# Patient Record
Sex: MALE | Race: WHITE | NOT HISPANIC OR LATINO | Employment: FULL TIME | URBAN - METROPOLITAN AREA
[De-identification: names, ages, dates, MRNs, and addresses within clinical notes are randomized per-mention and may not be internally consistent; named-entity substitution may affect disease eponyms.]

---

## 2019-07-06 ENCOUNTER — HOSPITAL ENCOUNTER (EMERGENCY)
Facility: HOSPITAL | Age: 54
Discharge: HOME | End: 2019-07-06
Attending: EMERGENCY MEDICINE

## 2019-07-06 VITALS
HEIGHT: 70 IN | WEIGHT: 175 LBS | TEMPERATURE: 98 F | HEART RATE: 98 BPM | OXYGEN SATURATION: 96 % | DIASTOLIC BLOOD PRESSURE: 101 MMHG | SYSTOLIC BLOOD PRESSURE: 135 MMHG | RESPIRATION RATE: 18 BRPM | BODY MASS INDEX: 25.05 KG/M2

## 2019-07-06 DIAGNOSIS — J36 PERITONSILLAR ABSCESS: Primary | ICD-10-CM

## 2019-07-06 LAB
ANION GAP SERPL CALC-SCNC: 12 MMOL/L (ref 8–16)
BASOPHILS # BLD AUTO: 0.04 K/UL (ref 0–0.2)
BASOPHILS NFR BLD: 0.3 % (ref 0–1.9)
BUN SERPL-MCNC: 7 MG/DL (ref 6–20)
CALCIUM SERPL-MCNC: 9.2 MG/DL (ref 8.7–10.5)
CHLORIDE SERPL-SCNC: 104 MMOL/L (ref 95–110)
CO2 SERPL-SCNC: 21 MMOL/L (ref 23–29)
CREAT SERPL-MCNC: 0.9 MG/DL (ref 0.5–1.4)
DIFFERENTIAL METHOD: ABNORMAL
EOSINOPHIL # BLD AUTO: 0.1 K/UL (ref 0–0.5)
EOSINOPHIL NFR BLD: 1 % (ref 0–8)
ERYTHROCYTE [DISTWIDTH] IN BLOOD BY AUTOMATED COUNT: 12.2 % (ref 11.5–14.5)
EST. GFR  (AFRICAN AMERICAN): >60 ML/MIN/1.73 M^2
EST. GFR  (NON AFRICAN AMERICAN): >60 ML/MIN/1.73 M^2
GLUCOSE SERPL-MCNC: 112 MG/DL (ref 70–110)
HCT VFR BLD AUTO: 46.2 % (ref 40–54)
HGB BLD-MCNC: 15.3 G/DL (ref 14–18)
IMM GRANULOCYTES # BLD AUTO: 0.07 K/UL (ref 0–0.04)
IMM GRANULOCYTES NFR BLD AUTO: 0.6 % (ref 0–0.5)
LYMPHOCYTES # BLD AUTO: 1.6 K/UL (ref 1–4.8)
LYMPHOCYTES NFR BLD: 13.4 % (ref 18–48)
MCH RBC QN AUTO: 33.7 PG (ref 27–31)
MCHC RBC AUTO-ENTMCNC: 33.1 G/DL (ref 32–36)
MCV RBC AUTO: 102 FL (ref 82–98)
MONOCYTES # BLD AUTO: 1 K/UL (ref 0.3–1)
MONOCYTES NFR BLD: 8.7 % (ref 4–15)
NEUTROPHILS # BLD AUTO: 9.1 K/UL (ref 1.8–7.7)
NEUTROPHILS NFR BLD: 76 % (ref 38–73)
NRBC BLD-RTO: 0 /100 WBC
PLATELET # BLD AUTO: 241 K/UL (ref 150–350)
PMV BLD AUTO: 9.2 FL (ref 9.2–12.9)
POTASSIUM SERPL-SCNC: 3.8 MMOL/L (ref 3.5–5.1)
RBC # BLD AUTO: 4.54 M/UL (ref 4.6–6.2)
SODIUM SERPL-SCNC: 137 MMOL/L (ref 136–145)
WBC # BLD AUTO: 12 K/UL (ref 3.9–12.7)

## 2019-07-06 PROCEDURE — 96365 THER/PROPH/DIAG IV INF INIT: CPT

## 2019-07-06 PROCEDURE — 99284 PR EMERGENCY DEPT VISIT,LEVEL IV: ICD-10-PCS | Mod: 25,,, | Performed by: EMERGENCY MEDICINE

## 2019-07-06 PROCEDURE — 10021 FNA BX W/O IMG GDN 1ST LES: CPT | Mod: 52

## 2019-07-06 PROCEDURE — 80048 BASIC METABOLIC PNL TOTAL CA: CPT

## 2019-07-06 PROCEDURE — 25000003 PHARM REV CODE 250: Performed by: EMERGENCY MEDICINE

## 2019-07-06 PROCEDURE — 42700 PR INC/DRAIN PERITONSIL ABSCESS: ICD-10-PCS | Mod: ,,, | Performed by: EMERGENCY MEDICINE

## 2019-07-06 PROCEDURE — S0077 INJECTION, CLINDAMYCIN PHOSP: HCPCS | Performed by: EMERGENCY MEDICINE

## 2019-07-06 PROCEDURE — 63600175 PHARM REV CODE 636 W HCPCS: Performed by: EMERGENCY MEDICINE

## 2019-07-06 PROCEDURE — 85025 COMPLETE CBC W/AUTO DIFF WBC: CPT

## 2019-07-06 PROCEDURE — 96375 TX/PRO/DX INJ NEW DRUG ADDON: CPT

## 2019-07-06 PROCEDURE — 87070 CULTURE OTHR SPECIMN AEROBIC: CPT

## 2019-07-06 PROCEDURE — 42700 I&D ABSCESS PERITONSILLAR: CPT | Mod: ,,, | Performed by: EMERGENCY MEDICINE

## 2019-07-06 PROCEDURE — 99284 EMERGENCY DEPT VISIT MOD MDM: CPT | Mod: 25,,, | Performed by: EMERGENCY MEDICINE

## 2019-07-06 PROCEDURE — 99284 EMERGENCY DEPT VISIT MOD MDM: CPT | Mod: 25

## 2019-07-06 RX ORDER — KETOROLAC TROMETHAMINE 30 MG/ML
10 INJECTION, SOLUTION INTRAMUSCULAR; INTRAVENOUS
Status: COMPLETED | OUTPATIENT
Start: 2019-07-06 | End: 2019-07-06

## 2019-07-06 RX ORDER — LIDOCAINE HYDROCHLORIDE 10 MG/ML
5 INJECTION, SOLUTION EPIDURAL; INFILTRATION; INTRACAUDAL; PERINEURAL
Status: COMPLETED | OUTPATIENT
Start: 2019-07-06 | End: 2019-07-06

## 2019-07-06 RX ORDER — KETOROLAC TROMETHAMINE 30 MG/ML
10 INJECTION, SOLUTION INTRAMUSCULAR; INTRAVENOUS
Status: DISCONTINUED | OUTPATIENT
Start: 2019-07-06 | End: 2019-07-06

## 2019-07-06 RX ORDER — DEXAMETHASONE SODIUM PHOSPHATE 4 MG/ML
8 INJECTION, SOLUTION INTRA-ARTICULAR; INTRALESIONAL; INTRAMUSCULAR; INTRAVENOUS; SOFT TISSUE
Status: COMPLETED | OUTPATIENT
Start: 2019-07-06 | End: 2019-07-06

## 2019-07-06 RX ORDER — AMOXICILLIN AND CLAVULANATE POTASSIUM 875; 125 MG/1; MG/1
1 TABLET, FILM COATED ORAL 2 TIMES DAILY
Qty: 14 TABLET | Refills: 0 | Status: SHIPPED | OUTPATIENT
Start: 2019-07-06 | End: 2019-07-13

## 2019-07-06 RX ORDER — CLINDAMYCIN HYDROCHLORIDE 300 MG/1
300 CAPSULE ORAL EVERY 8 HOURS
Qty: 21 CAPSULE | Refills: 0 | Status: SHIPPED | OUTPATIENT
Start: 2019-07-06 | End: 2019-07-13

## 2019-07-06 RX ORDER — HYDROCODONE BITARTRATE AND ACETAMINOPHEN 5; 325 MG/1; MG/1
1 TABLET ORAL EVERY 6 HOURS PRN
Qty: 6 TABLET | Refills: 0 | Status: SHIPPED | OUTPATIENT
Start: 2019-07-06 | End: 2019-07-08

## 2019-07-06 RX ORDER — NAPROXEN 500 MG/1
500 TABLET ORAL 2 TIMES DAILY PRN
Qty: 30 TABLET | Refills: 0 | Status: SHIPPED | OUTPATIENT
Start: 2019-07-06 | End: 2019-12-04

## 2019-07-06 RX ORDER — CLINDAMYCIN PHOSPHATE 600 MG/50ML
600 INJECTION, SOLUTION INTRAVENOUS
Status: COMPLETED | OUTPATIENT
Start: 2019-07-06 | End: 2019-07-06

## 2019-07-06 RX ORDER — AMOXICILLIN AND CLAVULANATE POTASSIUM 875; 125 MG/1; MG/1
1 TABLET, FILM COATED ORAL 2 TIMES DAILY
Qty: 14 TABLET | Refills: 0 | Status: SHIPPED | OUTPATIENT
Start: 2019-07-06 | End: 2019-07-06 | Stop reason: SDUPTHER

## 2019-07-06 RX ORDER — DEXAMETHASONE SODIUM PHOSPHATE 4 MG/ML
8 INJECTION, SOLUTION INTRA-ARTICULAR; INTRALESIONAL; INTRAMUSCULAR; INTRAVENOUS; SOFT TISSUE
Status: DISCONTINUED | OUTPATIENT
Start: 2019-07-06 | End: 2019-07-06

## 2019-07-06 RX ADMIN — CLINDAMYCIN IN 5 PERCENT DEXTROSE 600 MG: 12 INJECTION, SOLUTION INTRAVENOUS at 07:07

## 2019-07-06 RX ADMIN — KETOROLAC TROMETHAMINE 10 MG: 30 INJECTION, SOLUTION INTRAMUSCULAR at 08:07

## 2019-07-06 RX ADMIN — TOPICAL ANESTHETIC: 200 SPRAY DENTAL; PERIODONTAL at 07:07

## 2019-07-06 RX ADMIN — DEXAMETHASONE SODIUM PHOSPHATE 8 MG: 4 INJECTION, SOLUTION INTRAMUSCULAR; INTRAVENOUS at 07:07

## 2019-07-06 RX ADMIN — LIDOCAINE HYDROCHLORIDE 50 MG: 10 INJECTION, SOLUTION EPIDURAL; INFILTRATION; INTRACAUDAL; PERINEURAL at 08:07

## 2019-07-06 RX ADMIN — TOPICAL ANESTHETIC: 200 SPRAY DENTAL; PERIODONTAL at 08:07

## 2019-07-07 NOTE — ED NOTES
Noted.    Will update Dr. Chavez.   Dr. Tobias and SHANEL Santizo, at bedside for I&D. Suction set up at bedside.

## 2019-07-07 NOTE — ED NOTES
Mukesh Cooper, a 54 y.o. male presents to the ED w/ complaint of sore throat. Pt complaining of pain to right side of throat.    Triage note:  Chief Complaint   Patient presents with    Sore Throat     c/o sore throat x 3 days. + dysphagia. Unable to tolerate PO intake. Endorses difficulty speaking due to pain.      Review of patient's allergies indicates:  No Known Allergies  History reviewed. No pertinent past medical history.     Adult Physical Assessment  LOC: Mukesh Cooper, 54 y.o. male verified via two identifiers.  The patient is awake, alert, oriented and speaking appropriately at this time.  APPEARANCE: Patient resting comfortably and appears to be in no acute distress at this time. Patient is clean and well groomed, patient's clothing is properly fastened.  SKIN:The skin is warm and dry, color consistent with ethnicity, patient has normal skin turgor and moist mucus membranes, skin intact, no breakdown or brusing noted.  MUSCULOSKELETAL: Patient moving all extremities well, no obvious swelling or deformities noted.  RESPIRATORY: Airway is open and patent, respirations are spontaneous, patient has a normal effort and rate, no accessory muscle use noted.  HEENT: Pt reports pain with swallowing; pt reports inability to tolerate PO intake. Pt with swelling and redness to right tonsil. Pt with swelling to right throat. Trachea midline.  CARDIAC: Patient has a normal rate and rhythm, no periphreal edema noted in any extremity, capillary refill < 3 seconds in all extremities  ABDOMEN: Soft and non tender to palpation, no abdominal distention noted. Bowel sounds present in all four quadrants.  NEUROLOGIC: Eyes open spontaneously, behavior appropriate to situation, follows commands, facial expression symmetrical, bilateral hand grasp equal and even, purposeful motor response noted, normal sensation in all extremities when touched with a finger.

## 2019-07-07 NOTE — ED NOTES
"Pt states "I don't want to wait for the CT scan. Just get me my discharge papers and I'm going home." Dr. Tobias aware that patient is requesting to leave AMA.  "

## 2019-07-07 NOTE — ED PROVIDER NOTES
Encounter Date: 7/6/2019    SCRIBE #1 NOTE: I, Ansley Tse, am scribing for, and in the presence of,  Dr. Tobias. I have scribed the entire note.       History     Chief Complaint   Patient presents with    Sore Throat     c/o sore throat x 3 days. + dysphagia. Unable to tolerate PO intake. Endorses difficulty speaking due to pain.      Mr. Cooper is a 54 year old male with an unknown past medical history with complaints of a sore throat. The patient endorses a sore throat and difficulty swallowing for the past three days. He states that it feels like his throat is closing.  Pain is rated 10/10, throbbing aching in nature, located in the right side of the throat with associated voice change. He denies experiencing similar symptoms in the past. The patient reports that he is a current smoker. He also endorses diaphoresis, chills, occasional fevers, rhinorrhea, and a voice change for the past two nights. He reports that he tried treating his sore throat with Tylenol with little to no relief. He denies nausea and vomiting.     The history is provided by the patient and medical records.     Review of patient's allergies indicates:  No Known Allergies  No past medical history on file.  No past surgical history on file.  No family history on file.  Social History     Tobacco Use    Smoking status: Not on file   Substance Use Topics    Alcohol use: Not on file    Drug use: Not on file     Review of Systems   Constitutional: Positive for chills and diaphoresis. Negative for fever.   HENT: Positive for rhinorrhea, sore throat, trouble swallowing and voice change.    Eyes: Negative for photophobia.   Respiratory: Negative for cough and shortness of breath.    Cardiovascular: Negative for chest pain.   Gastrointestinal: Negative for nausea and vomiting.   Genitourinary: Negative for difficulty urinating and dysuria.   Musculoskeletal: Negative for back pain and neck pain.   Skin: Negative for rash.   Neurological: Negative  for weakness and numbness.   Hematological: Does not bruise/bleed easily.       Physical Exam     Initial Vitals [07/06/19 1910]   BP Pulse Resp Temp SpO2   (!) 135/101 98 18 98.2 °F (36.8 °C) 96 %      MAP       --         Physical Exam    Constitutional: He appears well-developed and well-nourished. He is not diaphoretic. No distress.   HENT:   Head: Normocephalic and atraumatic.   Mouth/Throat: No trismus in the jaw. No uvula swelling. Posterior oropharyngeal erythema and tonsillar abscesses present.   Large right peritonsillar abscess with erythema. Uvula shifted to the left.   Neck: Normal range of motion. Neck supple. No JVD present.   Cervical lymphadenopathy.   Cardiovascular: Normal rate, regular rhythm, normal heart sounds and intact distal pulses.   Pulmonary/Chest: No respiratory distress. He has wheezes (Diffuse experitory exterior and interior.). He has no rhonchi. He has no rales.   Abdominal: Soft. He exhibits no distension. There is no tenderness.   Musculoskeletal: Normal range of motion. He exhibits no edema.   Lymphadenopathy:     He has no cervical adenopathy.   Neurological: He is alert and oriented to person, place, and time. He has normal strength. No cranial nerve deficit or sensory deficit.   Skin: Skin is warm and dry.         ED Course   Abscess Aspiration  Date/Time: 7/6/2019 9:39 PM  Performed by: Omayra Tobias MD  Authorized by: Omayra Tobias MD     Consent Done?:  Emergent Situation  A time out verifies correct patient, procedure, equipment, support staff and site/side marked as required:   Procedure Details:     Location of Abscess #1:  Right PTA    Size of needle #1:  18    Aspirated amount #1 (mL):  1  Material send for:  Aerobic Culture  Post-procedure:     Patient tolerance:  Patient tolerated the procedure well with no immediate complications      Labs Reviewed   CBC W/ AUTO DIFFERENTIAL - Abnormal; Notable for the following components:       Result Value    RBC 4.54 (*)      Mean Corpuscular Volume 102 (*)     Mean Corpuscular Hemoglobin 33.7 (*)     Immature Granulocytes 0.6 (*)     Gran # (ANC) 9.1 (*)     Immature Grans (Abs) 0.07 (*)     Gran% 76.0 (*)     Lymph% 13.4 (*)     All other components within normal limits   BASIC METABOLIC PANEL - Abnormal; Notable for the following components:    CO2 21 (*)     Glucose 112 (*)     All other components within normal limits          Imaging Results    None          Medical Decision Making:   History:   Old Medical Records: I decided to obtain old medical records.  Initial Assessment:   Emergent evaluation of a 54 year old male with sore throat and dysphagia.   Differential Diagnosis:   My initial diagnoses include acute pharyngitis, tonsillitis, peritonsillar abscess, retropharyngeal abscess.   Clinical Tests:   Lab Tests: Ordered and Reviewed  Radiological Study: Ordered  ED Management:  - labs   - steroids   - antibiotics   - needle aspiration    Labs reviewed with leukocytosis of 12,000, CMP within normal limits.  Patient treated with steroids and 600 to clinda IV.  Needle aspiration attempted with minimal amount of blood, no visible pus.  I am unable to locate the pocket, will order CT neck soft tissue further evaluate.    9:00 PM  Discussed plan with patient, he is refusing imaging and wants to sign out AMA stating pain has improved. We discussed concerns incomplete drainage, worsening infection leading to sepsis or difficulty breathing.  He can repeat risk and understands.  He has agreed to one more attempt at drainage.    Needle aspiration performed with 1 cc purulent drainage.  Pt still requesting discharge.  Will provide px with Augmentin and clinda as he has limited income and is concerned with cost.                    Scribe Attestation:   Scribe #1: I performed the above scribed service and the documentation accurately describes the services I performed. I attest to the accuracy of the note.    Attending Attestation:            Physician Attestation for Scribe:      Comments: I, Dr. Omayra Tobias, personally performed the services described in this documentation. All medical record entries made by the scribe were at my direction and in my presence.  I have reviewed the chart and agree that the record reflects my personal performance and is accurate and complete. Omayra Tobias MD.                 Clinical Impression:       ICD-10-CM ICD-9-CM   1. Peritonsillar abscess J36 475         Disposition:   Disposition: ABIEL Tobias MD  07/06/19 2141

## 2019-07-08 LAB — BACTERIA SPEC AEROBE CULT: NORMAL

## 2019-07-09 LAB — BACTERIA SPEC AEROBE CULT: NORMAL

## 2019-12-04 ENCOUNTER — HOSPITAL ENCOUNTER (EMERGENCY)
Facility: HOSPITAL | Age: 54
Discharge: HOME | End: 2019-12-04
Attending: EMERGENCY MEDICINE
Payer: MEDICAID

## 2019-12-04 VITALS
BODY MASS INDEX: 25.18 KG/M2 | RESPIRATION RATE: 18 BRPM | SYSTOLIC BLOOD PRESSURE: 152 MMHG | DIASTOLIC BLOOD PRESSURE: 97 MMHG | OXYGEN SATURATION: 100 % | HEART RATE: 90 BPM | TEMPERATURE: 98 F | HEIGHT: 69 IN | WEIGHT: 170 LBS

## 2019-12-04 DIAGNOSIS — S89.92XA LEFT KNEE INJURY: Primary | ICD-10-CM

## 2019-12-04 PROCEDURE — 99284 EMERGENCY DEPT VISIT MOD MDM: CPT | Mod: 25

## 2019-12-04 PROCEDURE — 99284 EMERGENCY DEPT VISIT MOD MDM: CPT | Mod: ,,, | Performed by: EMERGENCY MEDICINE

## 2019-12-04 PROCEDURE — 99284 PR EMERGENCY DEPT VISIT,LEVEL IV: ICD-10-PCS | Mod: ,,, | Performed by: EMERGENCY MEDICINE

## 2019-12-04 PROCEDURE — 25000003 PHARM REV CODE 250: Performed by: EMERGENCY MEDICINE

## 2019-12-04 RX ORDER — HYDROCODONE BITARTRATE AND ACETAMINOPHEN 5; 325 MG/1; MG/1
1 TABLET ORAL EVERY 6 HOURS PRN
Qty: 12 TABLET | Refills: 0 | Status: SHIPPED | OUTPATIENT
Start: 2019-12-04 | End: 2019-12-07

## 2019-12-04 RX ORDER — OXYCODONE HYDROCHLORIDE 5 MG/1
10 TABLET ORAL
Status: COMPLETED | OUTPATIENT
Start: 2019-12-04 | End: 2019-12-04

## 2019-12-04 RX ORDER — IBUPROFEN 600 MG/1
600 TABLET ORAL EVERY 6 HOURS PRN
Qty: 20 TABLET | Refills: 0 | Status: SHIPPED | OUTPATIENT
Start: 2019-12-04 | End: 2020-12-03 | Stop reason: CLARIF

## 2019-12-04 RX ORDER — HYDROCODONE BITARTRATE AND ACETAMINOPHEN 10; 325 MG/1; MG/1
1 TABLET ORAL
Status: COMPLETED | OUTPATIENT
Start: 2019-12-04 | End: 2019-12-04

## 2019-12-04 RX ADMIN — HYDROCODONE BITARTRATE AND ACETAMINOPHEN 1 TABLET: 10; 325 TABLET ORAL at 06:12

## 2019-12-04 RX ADMIN — OXYCODONE HYDROCHLORIDE 10 MG: 5 TABLET ORAL at 08:12

## 2019-12-05 NOTE — ED PROVIDER NOTES
Encounter Date: 12/4/2019    SCRIBE #1 NOTE: I, Namrata Nettles, am scribing for, and in the presence of,  Dr. Deluca. I have scribed the following portions of the note - Other sections scribed: MAKI PRINCE.       History     Chief Complaint   Patient presents with    Knee Pain     my knee is busted, hit with hammer today while working.      53 yo M with no significant pmhx presents with a chief complaint of left knee injury. Injury occurred at 2:00pm this afternoon.  Patient was using a work hammer when he accidentally hit his left knee.  He has had pain and swelling since that time.  He has been unable to bear weight secondary to pain. No weakness or paresthesias.  Pain is worsened by movement.  He has not taken any medication for the pain. He has not applied any ice to it.  No previous injury to the knee.  No anticoagulants.    The history is provided by the patient and medical records.     Review of patient's allergies indicates:  No Known Allergies  History reviewed. No pertinent past medical history.  History reviewed. No pertinent surgical history.  History reviewed. No pertinent family history.  Social History     Tobacco Use    Smoking status: Not on file   Substance Use Topics    Alcohol use: Not on file    Drug use: Not on file     Review of Systems   Constitutional: Negative for fever.   HENT: Negative for sore throat.    Respiratory: Negative for shortness of breath.    Cardiovascular: Negative for chest pain.   Gastrointestinal: Negative for nausea.   Genitourinary: Negative for dysuria.   Musculoskeletal: Positive for joint swelling. Negative for back pain and myalgias.   Skin: Positive for wound. Negative for color change and rash.   Neurological: Negative for weakness and numbness.   Hematological: Does not bruise/bleed easily.       Physical Exam     Initial Vitals [12/04/19 1702]   BP Pulse Resp Temp SpO2   (!) 166/73 95 18 97.6 °F (36.4 °C) 95 %      MAP       --         Physical  Exam    Nursing note and vitals reviewed.  Constitutional: He appears well-developed and well-nourished. He is not diaphoretic. No distress.   HENT:   Head: Normocephalic.   Neck: Neck supple.   Cardiovascular: Normal rate.   Pulses:       Dorsalis pedis pulses are 2+ on the right side, and 2+ on the left side.   Pulmonary/Chest: No respiratory distress.   Musculoskeletal: He exhibits tenderness.        Left knee: He exhibits decreased range of motion, swelling (most pronounced in the medial aspect of superior knee), effusion and deformity. He exhibits no ecchymosis and no laceration. Tenderness (tenderness most pronounced in medial aspect of superior knee) found. No medial joint line, no lateral joint line and no patellar tendon tenderness noted.        Legs:  Exquisite pain with flexion of left knee, but not rigid.  Able to actively flex and extend with pain   Neurological: He is alert.   Skin: Skin is warm.         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  Correlate for point tenderness at this region.      Electronically signed by: Chuck Morel MD  Date:    12/04/2019  Time:    19:21             Narrative:    EXAMINATION:  XR KNEE 3 VIEW LEFT    CLINICAL HISTORY:  Unspecified injury of left lower leg, initial encounter    TECHNIQUE:  AP, lateral, and Merchant views of the left knee were performed.    COMPARISON:  None    FINDINGS:  Suspected nonspecific suprapatellar joint effusion.  There is well corticated osseous fragment adjacent to the superior and lateral aspect of the patella with corresponding adjacent defect in the patella suggesting sequela of  remote trauma versus bipartite patella configuration.  Overall alignment is otherwise within normal limits.  No convincing evidence of acute displaced fracture, dislocation or destructive osseous process.  Minimal tricompartmental degenerative change.                                 Medical Decision Making:   History:   Old Medical Records: I decided to obtain old medical records.  Initial Assessment:   55 yo M with no significant pmhx presents with a chief complaint of left knee injury.  Differential Diagnosis:   Fracture, sprain, strain, contusion, hematoma  Clinical Tests:   Radiological Study: Ordered  ED Management:  No skin disruption to require tetanus.  Will administer ice and analgesics.  Will obtain x-ray.    Reassessment:  On my read, x-ray concerning for patellar fracture but radiology disagreed.  Orthopedics consulted for management recommendations.  On re-evaluation, patient reports pain has improved but is still present.  Will administer additional analgesics.    Reassessment:  Orthopedics feels this is inconsistent with an acute fracture in rather a bipartitie patella.  They recommend symptomatic treatment and no follow-up.  Patient was provided with a knee immobilizer and crutches as he requested.  He was provided with instructions for RICE and symptomatic treatment.  Scripts for analgesics and NSAIDs provided.  Provided with sports medicine clinic for follow-up p.r.n..            Scribe Attestation:   Scribe #1: I performed the above scribed service and the documentation accurately describes the services I performed. I attest to the accuracy of the note.    Attending Attestation:           Physician Attestation for Scribe:      Comments: I, Dr. Anival Deluca, personally performed the services described in this documentation. All medical record entries made by the scribe were at my direction and in my presence.  I have reviewed the chart and agree that the record reflects my personal performance and  is accurate and complete. Anival Deluca MD.  8:54 PM 12/04/2019                            Clinical Impression:       ICD-10-CM ICD-9-CM   1. Left knee injury S89.92XA 959.7                             Anival Deluca MD  12/04/19 2051

## 2019-12-05 NOTE — CONSULTS
Orthopedic Surgery  Consult Note    Kenneth Mayorga  12/05/2019    CC:  Left knee pain and swelling    HPI: Kenneth Mayorga is a 54 y.o. male with no significant PMHx who presents with left knee pain and swelling. Patients states using a hammer when he accidentally leg go and it bounced from the floor and hit him in the left knee. Denies prior injuries or surgeries to the left knee. On nothing for blood thinners.  He ambulates at baseline without any assistive devices.  Denies other MSK pains or paresthesias.       History reviewed. No pertinent past medical history.  History reviewed. No pertinent surgical history.  History reviewed. No pertinent family history.  Social History     Socioeconomic History    Marital status: Single     Spouse name: Not on file    Number of children: Not on file    Years of education: Not on file    Highest education level: Not on file   Occupational History    Not on file   Social Needs    Financial resource strain: Not on file    Food insecurity:     Worry: Not on file     Inability: Not on file    Transportation needs:     Medical: Not on file     Non-medical: Not on file   Tobacco Use    Smoking status: Not on file   Substance and Sexual Activity    Alcohol use: Not on file    Drug use: Not on file    Sexual activity: Not on file   Lifestyle    Physical activity:     Days per week: Not on file     Minutes per session: Not on file    Stress: Not on file   Relationships    Social connections:     Talks on phone: Not on file     Gets together: Not on file     Attends Hinduism service: Not on file     Active member of club or organization: Not on file     Attends meetings of clubs or organizations: Not on file     Relationship status: Not on file   Other Topics Concern    Not on file   Social History Narrative    Not on file     No current facility-administered medications on file prior to encounter.      No current outpatient medications on file prior to encounter.          Review of Systems:  Constitutional: negative for fevers  Eyes: negative visual changes  ENT: negative for hearing loss  Respiratory: negative for dyspnea  Cardiovascular: negative for chest pain  Gastrointestinal: negative for abdominal pain  Genitourinary: negative for dysuria  Neurological: negative for headaches  Behavioral/Psych: negative for hallucinations  Endocrine: negative for temperature intolerance      Physical Exam:    Temp:  [97.6 °F (36.4 °C)] 97.6 °F (36.4 °C)  Pulse:  [90-95] 90  Resp:  [18] 18  SpO2:  [95 %-100 %] 100 %  BP: (152-166)/(73-97) 152/97    Vitals: Afebrile.  Vital signs stable.  General: No acute distress.  HEENT: Normocephalic. Atraumatic. Sclera anicteric. No tracheal deviation.  Cardio: Regular rate.  Chest: No increased work of breathing.  Abdominal: Nondistended.  Extremities: No cyanosis.  No clubbing.    Skin: No generalized rash.  Neuro: Awake. Alert. Oriented to person, place, time, and situation.  Psych: Normal appearance. Cooperative.  Appropriate mood.  Appropriate affect.      MSK:  LLE:  Skin intact throughout, no scars present, no abrasions  Moderate swelling around the  Superior/medial knee   No ecchymosis, erythema, or signs of cellulitis  TTP around the swelling  No joint line tenderness  No tenderness to the patella  No tenderness to palpation of proximal, middle, or distal aspects of femur, tibia, or fibula  No tenderness to palpation of foot  Full painless ROM of hip, knee, and ankle  Extensor mechanism is intact  No laxity with varus or valgus stress at knee  Compartments soft  SILT Sa/C[place]/DP/SP/T  Motor intact EHL/FHL/TA/Gastroc  2+ DP  Brisk capillary refill       Diagnostic Results:  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  There are no signs of any acute fractures or dislocation.  There is a moderate soft tissue edema of the medial aspect of the knee that does not extend into the joint    Assessment/Plan:  Kenneth Mayorga is a 54 y.o. male with  swelling of the medial aspect of the knee after sustaining a blow from a hammer.  There is no fractures. 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    - WBAT to LLE  - recommend compressive dressing around area of swelling  - Pt encouraged to keep extremity iced and elevated at all times  - Pain control per ED  - patient does not need orthopedic f/u for bipartite patella      Raffaele Cole MD  Orthopedic Surgery Resident  12/05/2019

## 2019-12-05 NOTE — ED NOTES
Patient identifiers have been checked and are correct.      LOC: The patient is awake, alert, and aware of environment. The patient is oriented x 3 and speaking appropriately.   APPEARANCE: No acute distress noted.   PSYCHOSOCIAL: Patient is calm and cooperative.  SKIN: The skin is warm, dry.  RESPIRATORY: Airway is open and patent. Bilateral chest rise and fall. Respirations are spontaneous, even and unlabored. Normal effort and rate noted. No accessory muscle use noted.   CARDIAC: Patient has a normal rate.   NEUROLOGIC: Eyes open spontaneously. Speech clear. Tolerating saliva secretions well. Able to follow commands, demonstrating ability to actively and appropriately communicate within context of current conversation. Symmetrical facial muscles. Moving all extremities. Movement is purposeful.   MUSCULOSKELETAL: Swelling and pain to left knee noted.

## 2020-12-03 ENCOUNTER — HOSPITAL ENCOUNTER (EMERGENCY)
Facility: HOSPITAL | Age: 55
Discharge: HOME | End: 2020-12-03
Attending: EMERGENCY MEDICINE
Payer: MEDICAID

## 2020-12-03 VITALS
TEMPERATURE: 98 F | HEIGHT: 69 IN | HEART RATE: 79 BPM | BODY MASS INDEX: 25.92 KG/M2 | OXYGEN SATURATION: 98 % | WEIGHT: 175 LBS | RESPIRATION RATE: 16 BRPM | SYSTOLIC BLOOD PRESSURE: 156 MMHG | DIASTOLIC BLOOD PRESSURE: 98 MMHG
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
## 2020-12-03 DIAGNOSIS — M25.512 ACUTE PAIN OF LEFT SHOULDER: Primary | ICD-10-CM

## 2020-12-03 DIAGNOSIS — M47.816 SPONDYLOSIS OF LUMBAR REGION WITHOUT MYELOPATHY OR RADICULOPATHY: ICD-10-CM

## 2020-12-03 DIAGNOSIS — R91.1 NODULE OF LEFT LUNG: ICD-10-CM

## 2020-12-03 LAB
ALBUMIN SERPL BCP-MCNC: 3.5 G/DL (ref 3.5–5.2)
ALP SERPL-CCNC: 107 U/L (ref 55–135)
ALT SERPL W/O P-5'-P-CCNC: 105 U/L (ref 10–44)
AMPHET+METHAMPHET UR QL: NEGATIVE
ANION GAP SERPL CALC-SCNC: 14 MMOL/L (ref 8–16)
AST SERPL-CCNC: 167 U/L (ref 10–40)
BARBITURATES UR QL SCN>200 NG/ML: NEGATIVE
BASOPHILS # BLD AUTO: 0.06 K/UL (ref 0–0.2)
BASOPHILS NFR BLD: 1.2 % (ref 0–1.9)
BENZODIAZ UR QL SCN>200 NG/ML: ABNORMAL
BILIRUB SERPL-MCNC: 0.5 MG/DL (ref 0.1–1)
BILIRUB UR QL STRIP: NEGATIVE
BUN SERPL-MCNC: 11 MG/DL (ref 6–20)
BUN SERPL-MCNC: 11 MG/DL (ref 6–30)
BZE UR QL SCN: NEGATIVE
CALCIUM SERPL-MCNC: 8.7 MG/DL (ref 8.7–10.5)
CANNABINOIDS UR QL SCN: ABNORMAL
CHLORIDE SERPL-SCNC: 101 MMOL/L (ref 95–110)
CHLORIDE SERPL-SCNC: 102 MMOL/L (ref 95–110)
CLARITY UR REFRACT.AUTO: CLEAR
CO2 SERPL-SCNC: 22 MMOL/L (ref 23–29)
COLOR UR AUTO: YELLOW
CREAT SERPL-MCNC: 0.9 MG/DL (ref 0.5–1.4)
CREAT SERPL-MCNC: 1 MG/DL (ref 0.5–1.4)
CREAT UR-MCNC: 169 MG/DL (ref 23–375)
DIFFERENTIAL METHOD: ABNORMAL
EOSINOPHIL # BLD AUTO: 0.1 K/UL (ref 0–0.5)
EOSINOPHIL NFR BLD: 2.6 % (ref 0–8)
ERYTHROCYTE [DISTWIDTH] IN BLOOD BY AUTOMATED COUNT: 12.7 % (ref 11.5–14.5)
EST. GFR  (AFRICAN AMERICAN): >60 ML/MIN/1.73 M^2
EST. GFR  (NON AFRICAN AMERICAN): >60 ML/MIN/1.73 M^2
ETHANOL UR-MCNC: 142 MG/DL
GLUCOSE SERPL-MCNC: 93 MG/DL (ref 70–110)
GLUCOSE SERPL-MCNC: 93 MG/DL (ref 70–110)
GLUCOSE UR QL STRIP: NEGATIVE
HCT VFR BLD AUTO: 44.5 % (ref 40–54)
HCT VFR BLD CALC: 47 %PCV (ref 36–54)
HGB BLD-MCNC: 14.5 G/DL (ref 14–18)
HGB UR QL STRIP: NEGATIVE
IMM GRANULOCYTES # BLD AUTO: 0.01 K/UL (ref 0–0.04)
IMM GRANULOCYTES NFR BLD AUTO: 0.2 % (ref 0–0.5)
KETONES UR QL STRIP: NEGATIVE
LEUKOCYTE ESTERASE UR QL STRIP: NEGATIVE
LYMPHOCYTES # BLD AUTO: 1.9 K/UL (ref 1–4.8)
LYMPHOCYTES NFR BLD: 38.2 % (ref 18–48)
MCH RBC QN AUTO: 34.2 PG (ref 27–31)
MCHC RBC AUTO-ENTMCNC: 32.6 G/DL (ref 32–36)
MCV RBC AUTO: 105 FL (ref 82–98)
METHADONE UR QL SCN>300 NG/ML: NEGATIVE
MONOCYTES # BLD AUTO: 0.6 K/UL (ref 0.3–1)
MONOCYTES NFR BLD: 12.4 % (ref 4–15)
NEUTROPHILS # BLD AUTO: 2.3 K/UL (ref 1.8–7.7)
NEUTROPHILS NFR BLD: 45.4 % (ref 38–73)
NITRITE UR QL STRIP: NEGATIVE
NRBC BLD-RTO: 0 /100 WBC
OPIATES UR QL SCN: NEGATIVE
PCP UR QL SCN>25 NG/ML: NEGATIVE
PH UR STRIP: 5 [PH] (ref 5–8)
PLATELET # BLD AUTO: 196 K/UL (ref 150–350)
PMV BLD AUTO: 9.9 FL (ref 9.2–12.9)
POC IONIZED CALCIUM: 1.05 MMOL/L (ref 1.06–1.42)
POC TCO2 (MEASURED): 24 MMOL/L (ref 23–29)
POTASSIUM BLD-SCNC: 3.6 MMOL/L (ref 3.5–5.1)
POTASSIUM SERPL-SCNC: 3.6 MMOL/L (ref 3.5–5.1)
PROT SERPL-MCNC: 7.5 G/DL (ref 6–8.4)
PROT UR QL STRIP: NEGATIVE
RBC # BLD AUTO: 4.24 M/UL (ref 4.6–6.2)
SAMPLE: ABNORMAL
SODIUM BLD-SCNC: 137 MMOL/L (ref 136–145)
SODIUM SERPL-SCNC: 137 MMOL/L (ref 136–145)
SP GR UR STRIP: 1.02 (ref 1–1.03)
TOXICOLOGY INFORMATION: ABNORMAL
TROPONIN I SERPL DL<=0.01 NG/ML-MCNC: 0.01 NG/ML (ref 0–0.03)
URN SPEC COLLECT METH UR: NORMAL
WBC # BLD AUTO: 5 K/UL (ref 3.9–12.7)

## 2020-12-03 PROCEDURE — 81003 URINALYSIS AUTO W/O SCOPE: CPT

## 2020-12-03 PROCEDURE — 80053 COMPREHEN METABOLIC PANEL: CPT

## 2020-12-03 PROCEDURE — 93010 EKG 12-LEAD: ICD-10-PCS | Mod: ,,, | Performed by: INTERNAL MEDICINE

## 2020-12-03 PROCEDURE — 99284 PR EMERGENCY DEPT VISIT,LEVEL IV: ICD-10-PCS | Mod: ,,, | Performed by: EMERGENCY MEDICINE

## 2020-12-03 PROCEDURE — 96374 THER/PROPH/DIAG INJ IV PUSH: CPT | Mod: 59

## 2020-12-03 PROCEDURE — 99285 EMERGENCY DEPT VISIT HI MDM: CPT | Mod: 25

## 2020-12-03 PROCEDURE — 84484 ASSAY OF TROPONIN QUANT: CPT

## 2020-12-03 PROCEDURE — 85025 COMPLETE CBC W/AUTO DIFF WBC: CPT

## 2020-12-03 PROCEDURE — 93005 ELECTROCARDIOGRAM TRACING: CPT

## 2020-12-03 PROCEDURE — 80307 DRUG TEST PRSMV CHEM ANLYZR: CPT

## 2020-12-03 PROCEDURE — 25500020 PHARM REV CODE 255: Performed by: EMERGENCY MEDICINE

## 2020-12-03 PROCEDURE — 63600175 PHARM REV CODE 636 W HCPCS: Performed by: STUDENT IN AN ORGANIZED HEALTH CARE EDUCATION/TRAINING PROGRAM

## 2020-12-03 PROCEDURE — 93010 ELECTROCARDIOGRAM REPORT: CPT | Mod: ,,, | Performed by: INTERNAL MEDICINE

## 2020-12-03 PROCEDURE — 99284 EMERGENCY DEPT VISIT MOD MDM: CPT | Mod: ,,, | Performed by: EMERGENCY MEDICINE

## 2020-12-03 RX ORDER — NAPROXEN 500 MG/1
500 TABLET ORAL 2 TIMES DAILY WITH MEALS
Qty: 28 TABLET | Refills: 0 | Status: SHIPPED | OUTPATIENT
Start: 2020-12-03 | End: 2020-12-17

## 2020-12-03 RX ORDER — MORPHINE SULFATE 2 MG/ML
3 INJECTION, SOLUTION INTRAMUSCULAR; INTRAVENOUS
Status: COMPLETED | OUTPATIENT
Start: 2020-12-03 | End: 2020-12-03

## 2020-12-03 RX ADMIN — MORPHINE SULFATE 3 MG: 2 INJECTION, SOLUTION INTRAMUSCULAR; INTRAVENOUS at 12:12

## 2020-12-03 RX ADMIN — IOHEXOL 75 ML: 350 INJECTION, SOLUTION INTRAVENOUS at 12:12

## 2020-12-03 NOTE — DISCHARGE INSTRUCTIONS
Contains abnormal data CT Chest Abdomen Pelvis With Contrast (Final result)  Result time 12/03/20 12:57:33  Final result by Chuck Morel MD (12/03/20 12:57:33)                Impression:      1. No acute process seen within the chest, abdomen or pelvis or evidence of pneumothorax, solid organ injury or acute displaced fracture-dislocation.   2. Hepatomegaly with suspected asymmetric geographic fatty infiltration predominantly in the right hepatic lobe.  Clinical correlation and with LFTs advised.   3. Mild diverticulosis coli without diverticulitis.   4. Coronary and systemic atherosclerosis.   5. Left lower lobe 3 mm ground-glass nodule.  For a ground glass nodule <6 mm, Fleischner Society 2017 guidelines recommend no routine follow up. However, suspicious features could warrant follow up with non-contrast chest CT at 2 years and 4 years after discovery.   6. L5 bilateral remote pars defects with associated minimal grade 1 anterolisthesis of L5 on S1.  Suspected asymmetric advanced degenerative disc disease at L2-3 level, with sequela of remote/chronic discitis osteomyelitis not excluded in the proper clinical setting, given no priors available for comparison/chronicity determination.  No convincing CT evidence of active/destructive discitis osteomyelitis at this time.  Correlate clinically.   7. Left renal 2.3 cm cyst.   8. Few additional findings as above.   This report was flagged in Epic as abnormal.  This report was flagged in Epic as containing an incidental finding.       Electronically signed by: Chuck Morel MD   Date: 12/03/2020   Time: 12:57            Narrative:    EXAMINATION:   CT CHEST ABDOMEN PELVIS WITH CONTRAST (XPD)     CLINICAL HISTORY:   fall;     TECHNIQUE:   Axial images of the chest, abdomen, and pelvis were acquired  after the use of 75 cc [id number] IV contrast.  Coronal and sagittal reconstructions were also generated.     COMPARISON:   None     FINDINGS:   Thoracic soft tissues: No  significant abnormality.     Aorta: Normal in course and caliber, without significant atherosclerotic plaque. There are three branching vessels at the arch.     Heart: Normal in size. No pericardial effusion. Multi-vessel coronary arterial calcifications noted.     Karin/Mediastinum: No significant lymphadenopathy     Lungs: Well aerated, without consolidation, pneumothorax or pleural fluid. Calcified granuloma within the anteromedial aspect of the left upper lobe.  2-3 mm solid pleural based nodule along the left major fissure.  There is a 3 mm ground-glass nodule within the posterolateral left lower lobe.  Few scattered linear opacities consistent with subsegmental scarring versus atelectasis.     Liver: Enlarged measuring 20 cm in maximum coronal length.  There is moderate to large-sized area of geographic hypoattenuation throughout majority of the right hepatic lobe and small area of geographic hypoattenuation at the inferior aspect of the medial left hepatic lobe near the fissure for the falciform ligament favored to represent asymmetric partial fatty infiltration.  No discrete hepatic mass seen.     Gallbladder: No calcified gallstones.     Bile Ducts: No evidence of dilated ducts.     Pancreas: No mass or peripancreatic fat stranding.     Spleen: Unremarkable.     Adrenals: Unremarkable.     Kidneys/ Ureters: Normal in size and location with relatively symmetric normal enhancement.  There is a 2.3 cm simple appearing parenchymal cyst at the left renal lower pole.  A few scattered tiny hypoattenuating cortical foci at the left kidney which are too small to characterize.  No hydronephrosis or nephrolithiasis. No ureteral dilatation.     Bladder: Suboptimally distended with mild circumferential wall thickening but no adjacent inflammation.     Reproductive organs: Prostate is normal in size containing dystrophic calcifications in the central gland.  Scattered calcifications of the bilateral seminal vesicles, left  greater than right.     GI Tract/Mesentery: No evidence of bowel obstruction or acute inflammation.  Multiple scattered colonic diverticula without evidence of acute diverticulitis.  There is long segment mural fat stratification of the ascending colon and majority of the transverse colon, nonspecific but likely sequela of remote/chronic infectious or inflammatory process or chronic laxative use.  No pneumatosis or portal venous gas.  Appendix and terminal ileum are within normal limits.     Peritoneal Space: No ascites. No free air.     Retroperitoneum:  No significant adenopathy.     Abdominal wall:  No significant abnormality.     Vasculature: Mild to moderate scattered calcific atherosclerosis of the aorta without aneurysm or dissection.  Minimal to mild scattered calcific atherosclerosis elsewhere.     Bones: Multilevel chronic appearing mild anterior wedge deformity of several mid to lower thoracic and also upper lumbar vertebral bodies.  Multilevel well corticated Susan node deformities of several superior and inferior endplates of mid to lower thoracic and upper lumbar spine.  Marked loss of disc space at L2-3 level with moderate to marked degree of sclerosis of the lower half of L2 and upper half of L3 with small marginal osteophytes and mild facet arthrosis most consistent with sequela of asymmetric advanced degenerative disc disease.  No adjacent soft tissue swelling, mass or fluid collection and no evidence of acute pathologic fracture.  There is 2 mm grade 1 anterolisthesis of L5 on S1 likely related to chronic appearing bilateral L5 pars defects.  No acute fracture-dislocation.  Age-appropriate degenerative changes elsewhere.                     CT Head Without Contrast (Final result)  Result time 12/03/20 12:27:19  Final result by Chuck Morel MD (12/03/20 12:27:19)                Impression:      No acute intracranial abnormality.     Left maxillary nonspecific chronic sinusitis with more  centralized inspissated secretions, noting superimposed fungal etiology not excluded.       Electronically signed by: Chuck Morel MD   Date: 12/03/2020   Time: 12:27            Narrative:    EXAMINATION:   CT HEAD WITHOUT CONTRAST     CLINICAL HISTORY:   fall;     TECHNIQUE:   Low dose axial CT images obtained throughout the head without intravenous contrast. Sagittal and coronal reconstructions were performed.     COMPARISON:   None.     FINDINGS:   Intracranial compartment: Brain appears normally formed.     Ventricles and sulci are normal in size for age without evidence of hydrocephalus. No extra-axial blood or fluid collections.     The brain parenchyma appears normal. No parenchymal mass, hemorrhage, edema or major vascular distribution infarct.     Skull/extracranial contents (limited evaluation): No fracture.  There is complete opacification of the left maxillary sinus extending medially into the ostiomeatal complex, with mucoperiosteal thickening as well as more centralized slightly heterogeneous hyperattenuation suggesting sequela chronic sinusitis with inspissated secretions; however, fungal etiology not excluded.  Additional patchy opacification of several anterior left ethmoidal air cells.  No air-fluid level seen.  Mastoid air cells and remaining imaged paranasal sinuses are essentially clear.  Imaged portions of the orbits are within normal limits.                     X-Ray Scapula Left (Final result)  Result time 12/03/20 12:04:31  Final result by Chacho Green III, MD (12/03/20 12:04:31)                Narrative:    EXAMINATION:   XR SCAPULA LEFT     CLINICAL HISTORY:   Unspecified fall, initial encounter     FINDINGS:   Scapula left: No fracture dislocation bone destruction seen.       Electronically signed by: Chacho Green MD   Date: 12/03/2020   Time: 12:04                    X-Ray Shoulder Trauma Left (Final result)  Result time 12/03/20 12:03:54  Final result by Chacho Green III, MD  (12/03/20 12:03:54)                Narrative:    EXAMINATION:   XR SHOULDER TRAUMA 3 VIEW LEFT     CLINICAL HISTORY:   Unspecified fall, initial encounter     FINDINGS:   Three views left shoulder: No fracture dislocation bone destruction seen.       Electronically signed by: Chacho Green MD   Date: 12/03/2020   Time: 12:03

## 2020-12-03 NOTE — ED NOTES
I-STAT Chem-8+ Results:   Value Reference Range   Sodium 137 136-145 mmol/L   Potassium  3.6 3.5-5.1 mmol/L   Chloride 102  mmol/L   Ionized Calcium 1.05 1.06-1.42 mmol/L   CO2 (measured) 24 23-29 mmol/L   Glucose 93  mg/dL   BUN 11 6-30 mg/dL   Creatinine 1.0 0.5-1.4 mg/dL   Hematocrit 47 36-54%

## 2020-12-03 NOTE — ED PROVIDER NOTES
"Encounter Date: 12/3/2020       History     Chief Complaint   Patient presents with    Shoulder Pain     Pt fell off roof a few days ago.  C/O left shoulder pain.  Also rib pain.  Pt also concerned he has cancer b/c his "stool just aint normal".  Onset 1yr ago     Mr. Mayorga is a 55-year-old male with no significant past medical history who presents emergency department due to left shoulder pain.  He states that on Tuesday morning he was cleaning the gutters of a roof when he tripped and fell forward striking his abdomen on the ladder and then fell down to the ground landing on his left shoulder.  He initially thought  the pain was going to resolve at home but decided to come to the emergency department because he states that he is having trouble lifting his left arm above his head.   He states that he has a sensation of electric shocks going down from his shoulder to his left fingers. He also has pain in his right middle quadrant of his abdomen. He last ate a donut at 7:30 a.m. this morning. He states that he drinks daily and admitted to drinking alcohol today he also smokes marijuana daily.         Review of patient's allergies indicates:  No Known Allergies  No past medical history on file.  No past surgical history on file.  No family history on file.  Social History     Tobacco Use    Smoking status: Not on file   Substance Use Topics    Alcohol use: Not on file    Drug use: Not on file     Review of Systems   Constitutional: Negative for activity change, appetite change, chills, fatigue and fever.   HENT: Positive for sinus pressure and sinus pain. Negative for congestion and sore throat.    Eyes: Negative for photophobia and visual disturbance.   Respiratory: Negative for cough, chest tightness, shortness of breath and wheezing.    Cardiovascular: Negative for chest pain, palpitations and leg swelling.   Gastrointestinal: Positive for abdominal pain. Negative for abdominal distention, constipation, " diarrhea, nausea and vomiting.   Genitourinary: Negative for flank pain.   Musculoskeletal: Positive for arthralgias. Negative for back pain, joint swelling and neck pain.   Neurological: Negative for dizziness, tremors, facial asymmetry, weakness, light-headedness, numbness and headaches.       Physical Exam     Initial Vitals [12/03/20 0935]   BP Pulse Resp Temp SpO2   125/81 100 20 98.7 °F (37.1 °C) 98 %      MAP       --         Physical Exam    Nursing note and vitals reviewed.  Constitutional: He appears well-developed and well-nourished. He is not diaphoretic. No distress.   HENT:   Head: Normocephalic and atraumatic.   Eyes: Conjunctivae and EOM are normal. Pupils are equal, round, and reactive to light.   Neck: Normal range of motion. No thyromegaly present. No JVD present.   Cardiovascular: Normal rate, regular rhythm, normal heart sounds and intact distal pulses. Exam reveals no gallop and no friction rub.    No murmur heard.  Pulmonary/Chest: Breath sounds normal. No stridor. No respiratory distress. He has no wheezes. He exhibits no tenderness.   Abdominal: Soft. Bowel sounds are normal. He exhibits no distension. There is abdominal tenderness.   Tenderness to the right of umbilicus   Musculoskeletal: Normal range of motion. Tenderness present. No edema.      Comments: L Scapula tenderness  Left shoulder tenderness   Neurological: He is alert and oriented to person, place, and time. He has normal strength and normal reflexes. No sensory deficit.   Skin: Skin is warm. Capillary refill takes less than 2 seconds.   Psychiatric: He has a normal mood and affect.         ED Course   Procedures  Labs Reviewed   CBC W/ AUTO DIFFERENTIAL - Abnormal; Notable for the following components:       Result Value    RBC 4.24 (*)      (*)     MCH 34.2 (*)     All other components within normal limits   COMPREHENSIVE METABOLIC PANEL - Abnormal; Notable for the following components:    CO2 22 (*)      (*)       (*)     All other components within normal limits   TOXICOLOGY SCREEN, URINE, RANDOM (COMPLIANCE) - Abnormal; Notable for the following components:    Alcohol, Urine 142 (*)     All other components within normal limits    Narrative:     Specimen Source->Urine   ISTAT PROCEDURE - Abnormal; Notable for the following components:    POC Ionized Calcium 1.05 (*)     All other components within normal limits   URINALYSIS, REFLEX TO URINE CULTURE    Narrative:     Specimen Source->Urine   TROPONIN I        ECG Results          EKG 12-lead (In process)  Result time 12/03/20 15:54:48    In process by Interface, Lab In [facility] (12/03/20 15:54:48)                 Narrative:    Test Reason : W19.XXXA,    Vent. Rate : 085 BPM     Atrial Rate : 085 BPM     P-R Int : 140 ms          QRS Dur : 076 ms      QT Int : 370 ms       P-R-T Axes : 000 066 054 degrees     QTc Int : 440 ms    Sinus rhythm with occasional Premature ventricular complexes  Otherwise normal ECG  No previous ECGs available    Referred By: [email or web address]   SELF           Confirmed By:                             Imaging Results           CT Chest Abdomen Pelvis With Contrast (Final result)  Result time 12/03/20 12:57:33    Final result by Chuck Morel MD (12/03/20 12:57:33)                 Impression:      1. No acute process seen within the chest, abdomen or pelvis or evidence of pneumothorax, solid organ injury or acute displaced fracture-dislocation.  2. Hepatomegaly with suspected asymmetric geographic fatty infiltration predominantly in the right hepatic lobe.  Clinical correlation and with LFTs advised.  3. Mild diverticulosis coli without diverticulitis.  4. Coronary and systemic atherosclerosis.  5. Left lower lobe 3 mm ground-glass nodule.  For a ground glass nodule <6 mm, Fleischner Society 2017 guidelines recommend no routine follow up. However, suspicious features could warrant follow up with non-contrast chest CT at 2 years and 4 years after  discovery.  6. L5 bilateral remote pars defects with associated minimal grade 1 anterolisthesis of L5 on S1.  Suspected asymmetric advanced degenerative disc disease at L2-3 level, with sequela of remote/chronic discitis osteomyelitis not excluded in the proper clinical setting, given no priors available for comparison/chronicity determination.  No convincing CT evidence of active/destructive discitis osteomyelitis at this time.  Correlate clinically.  7. Left renal 2.3 cm cyst.  8. Few additional findings as above.  This report was flagged in Epic as abnormal.  This report was flagged in Epic as containing an incidental finding.      Electronically signed by: Chuck Morel MD  Date:    12/03/2020  Time:    12:57             Narrative:    EXAMINATION:  CT CHEST ABDOMEN PELVIS WITH CONTRAST (XPD)    CLINICAL HISTORY:  fall;    TECHNIQUE:  Axial images of the chest, abdomen, and pelvis were acquired  after the use of 75 cc [id number] IV contrast.  Coronal and sagittal reconstructions were also generated.    COMPARISON:  None    FINDINGS:  Thoracic soft tissues: No significant abnormality.    Aorta: Normal in course and caliber, without significant atherosclerotic plaque. There are three branching vessels at the arch.    Heart: Normal in size. No pericardial effusion. Multi-vessel coronary arterial calcifications noted.    Karin/Mediastinum: No significant lymphadenopathy    Lungs: Well aerated, without consolidation, pneumothorax or pleural fluid. Calcified granuloma within the anteromedial aspect of the left upper lobe.  2-3 mm solid pleural based nodule along the left major fissure.  There is a 3 mm ground-glass nodule within the posterolateral left lower lobe.  Few scattered linear opacities consistent with subsegmental scarring versus atelectasis.    Liver: Enlarged measuring 20 cm in maximum coronal length.  There is moderate to large-sized area of geographic hypoattenuation throughout majority of the right hepatic  lobe and small area of geographic hypoattenuation at the inferior aspect of the medial left hepatic lobe near the fissure for the falciform ligament favored to represent asymmetric partial fatty infiltration.  No discrete hepatic mass seen.    Gallbladder: No calcified gallstones.    Bile Ducts: No evidence of dilated ducts.    Pancreas: No mass or peripancreatic fat stranding.    Spleen: Unremarkable.    Adrenals: Unremarkable.    Kidneys/ Ureters: Normal in size and location with relatively symmetric normal enhancement.  There is a 2.3 cm simple appearing parenchymal cyst at the left renal lower pole.  A few scattered tiny hypoattenuating cortical foci at the left kidney which are too small to characterize.  No hydronephrosis or nephrolithiasis. No ureteral dilatation.    Bladder: Suboptimally distended with mild circumferential wall thickening but no adjacent inflammation.    Reproductive organs: Prostate is normal in size containing dystrophic calcifications in the central gland.  Scattered calcifications of the bilateral seminal vesicles, left greater than right.    GI Tract/Mesentery: No evidence of bowel obstruction or acute inflammation.  Multiple scattered colonic diverticula without evidence of acute diverticulitis.  There is long segment mural fat stratification of the ascending colon and majority of the transverse colon, nonspecific but likely sequela of remote/chronic infectious or inflammatory process or chronic laxative use.  No pneumatosis or portal venous gas.  Appendix and terminal ileum are within normal limits.    Peritoneal Space: No ascites. No free air.    Retroperitoneum:  No significant adenopathy.    Abdominal wall:  No significant abnormality.    Vasculature: Mild to moderate scattered calcific atherosclerosis of the aorta without aneurysm or dissection.  Minimal to mild scattered calcific atherosclerosis elsewhere.    Bones: Multilevel chronic appearing mild anterior wedge deformity of  several mid to lower thoracic and also upper lumbar vertebral bodies.  Multilevel well corticated Susan node deformities of several superior and inferior endplates of mid to lower thoracic and upper lumbar spine.  Marked loss of disc space at L2-3 level with moderate to marked degree of sclerosis of the lower half of L2 and upper half of L3 with small marginal osteophytes and mild facet arthrosis most consistent with sequela of asymmetric advanced degenerative disc disease.  No adjacent soft tissue swelling, mass or fluid collection and no evidence of acute pathologic fracture.  There is 2 mm grade 1 anterolisthesis of L5 on S1 likely related to chronic appearing bilateral L5 pars defects.  No acute fracture-dislocation.  Age-appropriate degenerative changes elsewhere.                               CT Head Without Contrast (Final result)  Result time 12/03/20 12:27:19    Final result by Chuck Morel MD (12/03/20 12:27:19)                 Impression:      No acute intracranial abnormality.    Left maxillary nonspecific chronic sinusitis with more centralized inspissated secretions, noting superimposed fungal etiology not excluded.      Electronically signed by: Chuck Morel MD  Date:    12/03/2020  Time:    12:27             Narrative:    EXAMINATION:  CT HEAD WITHOUT CONTRAST    CLINICAL HISTORY:  fall;    TECHNIQUE:  Low dose axial CT images obtained throughout the head without intravenous contrast. Sagittal and coronal reconstructions were performed.    COMPARISON:  None.    FINDINGS:  Intracranial compartment: Brain appears normally formed.    Ventricles and sulci are normal in size for age without evidence of hydrocephalus. No extra-axial blood or fluid collections.    The brain parenchyma appears normal. No parenchymal mass, hemorrhage, edema or major vascular distribution infarct.    Skull/extracranial contents (limited evaluation): No fracture.  There is complete opacification of the left maxillary sinus  extending medially into the ostiomeatal complex, with mucoperiosteal thickening as well as more centralized slightly heterogeneous hyperattenuation suggesting sequela chronic sinusitis with inspissated secretions; however, fungal etiology not excluded.  Additional patchy opacification of several anterior left ethmoidal air cells.  No air-fluid level seen.  Mastoid air cells and remaining imaged paranasal sinuses are essentially clear.  Imaged portions of the orbits are within normal limits.                               X-Ray Scapula Left (Final result)  Result time 12/03/20 12:04:31    Final result by Chacho Green III, MD (12/03/20 12:04:31)                 Narrative:    EXAMINATION:  XR SCAPULA LEFT    CLINICAL HISTORY:  Unspecified fall, initial encounter    FINDINGS:  Scapula left: No fracture dislocation bone destruction seen.      Electronically signed by: Chacho Green MD  Date:    12/03/2020  Time:    12:04                             X-Ray Shoulder Trauma Left (Final result)  Result time 12/03/20 12:03:54    Final result by Chacho Green III, MD (12/03/20 12:03:54)                 Narrative:    EXAMINATION:  XR SHOULDER TRAUMA 3 VIEW LEFT    CLINICAL HISTORY:  Unspecified fall, initial encounter    FINDINGS:  Three views left shoulder: No fracture dislocation bone destruction seen.      Electronically signed by: Chacho Green MD  Date:    12/03/2020  Time:    12:03                               Medical Decision Making:   Initial Assessment:   Mr. Mayorga is a 55-year-old male with no significant past medical history who presents emergency department due to left shoulder pain, abdominal pain after falling from 10 ft. He appeared to be intoxicated on exam.   Differential Diagnosis:   Humeral fracture  Scapula fracture  Intracranial hemorrhage  Intra abdominal hemorrhage    ED Management:  Patient was examined and was stable on exam.  Labs were ordered including a CMP which was significant for elevated  LFT's,  CBC was non-significant troponin was normal, I-STAT was non-significant,urine toxicology was significant for elevated alcohol benzodiazepines and marijuana.  CT of chest,  Abdomen and pelvis was obtained which was significant for a lung nodule as well as degenerative spine disease.  CT of the head did not show any signs of acute hemorrhage.  X-ray of the scapula did not show any fracture or dislocation and  X-ray of the shoulder did not show any fracture or dislocation.  At this time, we believe the etiology of the patient's shoulder pain is possibly muscle strain. Patient was given a prescription for naproxen, he was advised on techniques at home to improve his arm pain.  He was informed about his lung nodule and advised to follow-up with internal medicine to establish care.  Patient was discharged in stable condition and return precautions were given.               Attending Attestation:   Physician Attestation Statement for Resident:  As the supervising MD   Physician Attestation Statement: I have personally seen and examined this patient.   I agree with the above history. -:   As the supervising MD I agree with the above PE.    As the supervising MD I agree with the above treatment, course, plan, and disposition.        Attending Critical Care:   Critical Care Times:   Direct Patient Care (initial evaluation, reassessments, and time considering the case)................................................................15 minutes.   Additional History from reviewing old medical records or taking additional history from the family, EMS, PCP, etc.......................5 minutes.   Ordering, Reviewing, and Interpreting Diagnostic Studies...............................................................................................................5 minutes.    Documentation..................................................................................................................................................................................5 minutes.   ==============================================================  · Total Critical Care Time - exclusive of procedural time: 30 minutes.  ==============================================================  Critical care was necessary to treat or prevent imminent or life-threatening deterioration of the following conditions: trauma.   Critical care was time spent personally by me on the following activities: obtaining history from patient or relative, examination of patient, review of x-rays / CT sent with the patient, review of old charts, ordering lab, x-rays, and/or EKG, ordering and performing treatments and interventions, development of treatment plan with patient or relative, evaluation of patient's response to treatment and re-evaluation of patient's conition.   Critical Care Condition: potentially life-threatening       Attending ED Notes:   STAFF ATTENDING PHYSICIAN NOTE:  I have individually/jointly evaluated Kenneth Mayorga and discussed their ED management with the resident physician. I have also reviewed their notes, assessments, and procedures documented.  I was present during all critical portions of any procedure(s) performed on Kenneth Mayorga.   ____________________  Doni Chan MD, [facility]  Emergency Medicine Staff                      Clinical Impression:     ICD-10-CM ICD-9-CM   1. Acute pain of left shoulder  M25.512 719.41   2. Fall  W19.XXXA E888.9   3. Nodule of left lung  R91.1 793.11   4. Spondylosis of lumbar region without myelopathy or radiculopathy  M47.816 721.3                      Disposition:   Disposition: Discharged  Condition: Stable     ED Disposition Condition    Discharge Stable        ED Prescriptions     Medication Sig Dispense Start Date End Date Auth. Provider    naproxen (NAPROSYN)  500 MG tablet Take 1 tablet (500 mg total) by mouth 2 (two) times daily with meals. for 14 days 28 tablet 12/3/2020 12/17/2020 Nick Sousa MD        Follow-up Information     Follow up With Specialties Details Why Contact Info Additional Information    [facility] Schedule an appointment as soon as possible for a visit in 3 days  [id number]  [facility] [address] [phone] [facility] Please park in surface lot and check in at central registration desk                                       Nick Sousa MD  Resident  12/03/20 1451       Aj Chan MD  12/04/20 0814

## 2020-12-03 NOTE — ED NOTES
Patient identifiers verified and correct for Mr Mayorga  C/C: Pain to left shoulder SEE NN  APPEARANCE: awake and alert in NAD.  SKIN: warm, dry and intact. No breakdown or bruising. Speech sl slurred, smells heavily of marijuana  MUSCULOSKELETAL: Patient moving all extremities spontaneously, no obvious swelling or deformities noted. Ambulates independently.  RESPIRATORY: Denies shortness of breath.Respirations unlabored.   CARDIAC: Denies CP, 2+ distal pulses; no peripheral edema  ABDOMEN: S/ND/NT, Denies nausea  : voids spontaneously, denies difficulty  Neurologic: AAO x 4; follows commands equal strength in all extremities; denies numbness/tingling. Denies dizziness Denies weakness in arm, positive sensation,mvmt and raidl pulses, states tingling to fingertips.

## 2020-12-03 NOTE — ED TRIAGE NOTES
"Patient states he fell off roof 2 days PTA, ( Tuesday at lunch)  approx 10 feet, states he hit a ladder on the way down. Pain to left shoulder, states his neighbor attempted to push a bone back in that was "poking out" Also states pain to prince upper abdomen rib area. Denies hitting head or LOC. No OTC meds. Alchohol and marijuana use last night States tingling to fingertips, deformity noted. .   "

## 2020-12-30 ENCOUNTER — HOSPITAL ENCOUNTER (EMERGENCY)
Facility: HOSPITAL | Age: 55
Discharge: HOME | End: 2020-12-30
Attending: EMERGENCY MEDICINE
Payer: MEDICAID

## 2020-12-30 VITALS
OXYGEN SATURATION: 97 % | DIASTOLIC BLOOD PRESSURE: 58 MMHG | WEIGHT: 175 LBS | BODY MASS INDEX: 25.92 KG/M2 | HEART RATE: 96 BPM | RESPIRATION RATE: 18 BRPM | HEIGHT: 69 IN | TEMPERATURE: 98 F | SYSTOLIC BLOOD PRESSURE: 130 MMHG

## 2020-12-30 DIAGNOSIS — R05.9 COUGH: Primary | ICD-10-CM

## 2020-12-30 DIAGNOSIS — R06.02 SHORTNESS OF BREATH: ICD-10-CM

## 2020-12-30 LAB
CTP QC/QA: YES
SARS-COV-2 RDRP RESP QL NAA+PROBE: NEGATIVE

## 2020-12-30 PROCEDURE — 63600175 PHARM REV CODE 636 W HCPCS: Performed by: PHYSICIAN ASSISTANT

## 2020-12-30 PROCEDURE — 99284 EMERGENCY DEPT VISIT MOD MDM: CPT | Mod: CS,,, | Performed by: PHYSICIAN ASSISTANT

## 2020-12-30 PROCEDURE — 63700000 PHARM REV CODE 250 ALT 637 W/O HCPCS: Performed by: PHYSICIAN ASSISTANT

## 2020-12-30 PROCEDURE — 99900035 HC TECH TIME PER 15 MIN (STAT)

## 2020-12-30 PROCEDURE — 99284 EMERGENCY DEPT VISIT MOD MDM: CPT | Mod: 25

## 2020-12-30 PROCEDURE — 94640 AIRWAY INHALATION TREATMENT: CPT

## 2020-12-30 PROCEDURE — 99284 PR EMERGENCY DEPT VISIT,LEVEL IV: ICD-10-PCS | Mod: CS,,, | Performed by: PHYSICIAN ASSISTANT

## 2020-12-30 PROCEDURE — U0002 COVID-19 LAB TEST NON-CDC: HCPCS | Performed by: EMERGENCY MEDICINE

## 2020-12-30 PROCEDURE — 25000242 PHARM REV CODE 250 ALT 637 W/ HCPCS: Performed by: PHYSICIAN ASSISTANT

## 2020-12-30 PROCEDURE — 25000003 PHARM REV CODE 250: Performed by: PHYSICIAN ASSISTANT

## 2020-12-30 RX ORDER — PREDNISONE 20 MG/1
40 TABLET ORAL DAILY
Qty: 8 TABLET | Refills: 0 | Status: SHIPPED | OUTPATIENT
Start: 2020-12-31 | End: 2021-01-04

## 2020-12-30 RX ORDER — ACETAMINOPHEN 500 MG
1000 TABLET ORAL
Status: COMPLETED | OUTPATIENT
Start: 2020-12-30 | End: 2020-12-30

## 2020-12-30 RX ORDER — IPRATROPIUM BROMIDE AND ALBUTEROL SULFATE 2.5; .5 MG/3ML; MG/3ML
3 SOLUTION RESPIRATORY (INHALATION)
Status: COMPLETED | OUTPATIENT
Start: 2020-12-30 | End: 2020-12-30

## 2020-12-30 RX ORDER — BENZONATATE 100 MG/1
100 CAPSULE ORAL
Status: COMPLETED | OUTPATIENT
Start: 2020-12-30 | End: 2020-12-30

## 2020-12-30 RX ORDER — AZITHROMYCIN 250 MG/1
250 TABLET, FILM COATED ORAL DAILY
Qty: 4 TABLET | Refills: 0 | Status: SHIPPED | OUTPATIENT
Start: 2020-12-31 | End: 2021-01-04

## 2020-12-30 RX ORDER — PREDNISONE 20 MG/1
40 TABLET ORAL
Status: COMPLETED | OUTPATIENT
Start: 2020-12-30 | End: 2020-12-30

## 2020-12-30 RX ORDER — BENZONATATE 100 MG/1
100 CAPSULE ORAL 3 TIMES DAILY PRN
Qty: 20 CAPSULE | Refills: 0 | Status: SHIPPED | OUTPATIENT
Start: 2020-12-30 | End: 2021-01-09

## 2020-12-30 RX ORDER — OXYMETAZOLINE HCL 0.05 %
1 SPRAY, NON-AEROSOL (ML) NASAL
Status: COMPLETED | OUTPATIENT
Start: 2020-12-30 | End: 2020-12-30

## 2020-12-30 RX ORDER — AZITHROMYCIN 250 MG/1
500 TABLET, FILM COATED ORAL
Status: COMPLETED | OUTPATIENT
Start: 2020-12-30 | End: 2020-12-30

## 2020-12-30 RX ADMIN — ACETAMINOPHEN 1000 MG: 500 TABLET ORAL at 09:12

## 2020-12-30 RX ADMIN — BENZONATATE 100 MG: 100 CAPSULE ORAL at 09:12

## 2020-12-30 RX ADMIN — PREDNISONE 40 MG: 20 TABLET ORAL at 10:12

## 2020-12-30 RX ADMIN — IPRATROPIUM BROMIDE AND ALBUTEROL SULFATE 3 ML: .5; 2.5 SOLUTION RESPIRATORY (INHALATION) at 10:12

## 2020-12-30 RX ADMIN — AZITHROMYCIN MONOHYDRATE 500 MG: 250 TABLET ORAL at 10:12

## 2020-12-30 RX ADMIN — Medication 1 SPRAY: at 09:12

## 2022-02-22 ENCOUNTER — HOSPITAL ENCOUNTER (EMERGENCY)
Facility: HOSPITAL | Age: 57
Discharge: HOME | End: 2022-02-23
Attending: EMERGENCY MEDICINE
Payer: MEDICAID

## 2022-02-22 VITALS
DIASTOLIC BLOOD PRESSURE: 84 MMHG | WEIGHT: 170 LBS | HEIGHT: 68 IN | HEART RATE: 89 BPM | RESPIRATION RATE: 16 BRPM | TEMPERATURE: 98 F | OXYGEN SATURATION: 97 % | SYSTOLIC BLOOD PRESSURE: 143 MMHG | BODY MASS INDEX: 25.76 KG/M2

## 2022-02-22 DIAGNOSIS — K29.20 ACUTE ALCOHOLIC GASTRITIS WITHOUT HEMORRHAGE: Primary | ICD-10-CM

## 2022-02-22 DIAGNOSIS — R05.9 COUGH: ICD-10-CM

## 2022-02-22 LAB
ALBUMIN SERPL BCP-MCNC: 3.6 G/DL (ref 3.5–5.2)
ALP SERPL-CCNC: 137 U/L (ref 55–135)
ALT SERPL W/O P-5'-P-CCNC: 71 U/L (ref 10–44)
ANION GAP SERPL CALC-SCNC: 16 MMOL/L (ref 8–16)
AST SERPL-CCNC: 194 U/L (ref 10–40)
BASOPHILS # BLD AUTO: 0.08 K/UL (ref 0–0.2)
BASOPHILS NFR BLD: 0.7 % (ref 0–1.9)
BILIRUB SERPL-MCNC: 1.6 MG/DL (ref 0.1–1)
BUN SERPL-MCNC: 3 MG/DL (ref 6–20)
CALCIUM SERPL-MCNC: 9 MG/DL (ref 8.7–10.5)
CHLORIDE SERPL-SCNC: 97 MMOL/L (ref 95–110)
CO2 SERPL-SCNC: 27 MMOL/L (ref 23–29)
CREAT SERPL-MCNC: 0.8 MG/DL (ref 0.5–1.4)
CTP QC/QA: YES
DIFFERENTIAL METHOD: ABNORMAL
EOSINOPHIL # BLD AUTO: 0.1 K/UL (ref 0–0.5)
EOSINOPHIL NFR BLD: 0.7 % (ref 0–8)
ERYTHROCYTE [DISTWIDTH] IN BLOOD BY AUTOMATED COUNT: 12.7 % (ref 11.5–14.5)
EST. GFR  (AFRICAN AMERICAN): >60 ML/MIN/1.73 M^2
EST. GFR  (NON AFRICAN AMERICAN): >60 ML/MIN/1.73 M^2
ETHANOL SERPL-MCNC: 184 MG/DL
GLUCOSE SERPL-MCNC: 106 MG/DL (ref 70–110)
HCT VFR BLD AUTO: 43.2 % (ref 40–54)
HGB BLD-MCNC: 15 G/DL (ref 14–18)
IMM GRANULOCYTES # BLD AUTO: 0.08 K/UL (ref 0–0.04)
IMM GRANULOCYTES NFR BLD AUTO: 0.7 % (ref 0–0.5)
LIPASE SERPL-CCNC: 35 U/L (ref 4–60)
LYMPHOCYTES # BLD AUTO: 2 K/UL (ref 1–4.8)
LYMPHOCYTES NFR BLD: 16.7 % (ref 18–48)
MCH RBC QN AUTO: 35 PG (ref 27–31)
MCHC RBC AUTO-ENTMCNC: 34.7 G/DL (ref 32–36)
MCV RBC AUTO: 101 FL (ref 82–98)
MONOCYTES # BLD AUTO: 1 K/UL (ref 0.3–1)
MONOCYTES NFR BLD: 8.3 % (ref 4–15)
NEUTROPHILS # BLD AUTO: 8.8 K/UL (ref 1.8–7.7)
NEUTROPHILS NFR BLD: 72.9 % (ref 38–73)
NRBC BLD-RTO: 0 /100 WBC
PLATELET # BLD AUTO: 217 K/UL (ref 150–450)
PMV BLD AUTO: 10.5 FL (ref 9.2–12.9)
POTASSIUM SERPL-SCNC: 3 MMOL/L (ref 3.5–5.1)
PROT SERPL-MCNC: 8.1 G/DL (ref 6–8.4)
RBC # BLD AUTO: 4.29 M/UL (ref 4.6–6.2)
SARS-COV-2 RDRP RESP QL NAA+PROBE: NEGATIVE
SODIUM SERPL-SCNC: 140 MMOL/L (ref 136–145)
WBC # BLD AUTO: 11.99 K/UL (ref 3.9–12.7)

## 2022-02-22 PROCEDURE — 86803 HEPATITIS C AB TEST: CPT | Performed by: EMERGENCY MEDICINE

## 2022-02-22 PROCEDURE — 83690 ASSAY OF LIPASE: CPT | Performed by: EMERGENCY MEDICINE

## 2022-02-22 PROCEDURE — 99284 PR EMERGENCY DEPT VISIT,LEVEL IV: ICD-10-PCS | Mod: CS,,, | Performed by: EMERGENCY MEDICINE

## 2022-02-22 PROCEDURE — 25000003 PHARM REV CODE 250: Performed by: EMERGENCY MEDICINE

## 2022-02-22 PROCEDURE — 87389 HIV-1 AG W/HIV-1&-2 AB AG IA: CPT | Performed by: EMERGENCY MEDICINE

## 2022-02-22 PROCEDURE — 85025 COMPLETE CBC W/AUTO DIFF WBC: CPT | Performed by: EMERGENCY MEDICINE

## 2022-02-22 PROCEDURE — 80053 COMPREHEN METABOLIC PANEL: CPT | Performed by: EMERGENCY MEDICINE

## 2022-02-22 PROCEDURE — U0002 COVID-19 LAB TEST NON-CDC: HCPCS | Performed by: EMERGENCY MEDICINE

## 2022-02-22 PROCEDURE — 99285 EMERGENCY DEPT VISIT HI MDM: CPT | Mod: 25

## 2022-02-22 PROCEDURE — 99284 EMERGENCY DEPT VISIT MOD MDM: CPT | Mod: CS,,, | Performed by: EMERGENCY MEDICINE

## 2022-02-22 PROCEDURE — 82077 ASSAY SPEC XCP UR&BREATH IA: CPT | Performed by: EMERGENCY MEDICINE

## 2022-02-22 PROCEDURE — 25500020 PHARM REV CODE 255: Performed by: EMERGENCY MEDICINE

## 2022-02-22 PROCEDURE — 81001 URINALYSIS AUTO W/SCOPE: CPT | Mod: 59 | Performed by: EMERGENCY MEDICINE

## 2022-02-22 RX ORDER — ACETAMINOPHEN 500 MG
1000 TABLET ORAL
Status: COMPLETED | OUTPATIENT
Start: 2022-02-22 | End: 2022-02-22

## 2022-02-22 RX ADMIN — IOHEXOL 75 ML: 350 INJECTION, SOLUTION INTRAVENOUS at 10:02

## 2022-02-22 RX ADMIN — ACETAMINOPHEN 1000 MG: 500 TABLET ORAL at 08:02

## 2022-02-23 LAB
BACTERIA #/AREA URNS AUTO: NORMAL /HPF
BILIRUB UR QL STRIP: NEGATIVE
CLARITY UR REFRACT.AUTO: CLEAR
COLOR UR AUTO: YELLOW
GLUCOSE UR QL STRIP: NEGATIVE
HGB UR QL STRIP: ABNORMAL
KETONES UR QL STRIP: NEGATIVE
LEUKOCYTE ESTERASE UR QL STRIP: NEGATIVE
MICROSCOPIC COMMENT: NORMAL
NITRITE UR QL STRIP: NEGATIVE
PH UR STRIP: 7 [PH] (ref 5–8)
PROT UR QL STRIP: NEGATIVE
RBC #/AREA URNS AUTO: 3 /HPF (ref 0–4)
SP GR UR STRIP: >=1.03 (ref 1–1.03)
SQUAMOUS #/AREA URNS AUTO: 1 /HPF
URN SPEC COLLECT METH UR: ABNORMAL
WBC #/AREA URNS AUTO: 1 /HPF (ref 0–5)

## 2022-02-23 PROCEDURE — 25000003 PHARM REV CODE 250: Performed by: EMERGENCY MEDICINE

## 2022-02-23 RX ORDER — POTASSIUM CHLORIDE 20 MEQ/1
40 TABLET, EXTENDED RELEASE ORAL
Status: COMPLETED | OUTPATIENT
Start: 2022-02-23 | End: 2022-02-23

## 2022-02-23 RX ORDER — MAG HYDROX/ALUMINUM HYD/SIMETH 200-200-20
30 SUSPENSION, ORAL (FINAL DOSE FORM) ORAL
Status: COMPLETED | OUTPATIENT
Start: 2022-02-23 | End: 2022-02-23

## 2022-02-23 RX ORDER — PANTOPRAZOLE SODIUM 40 MG/1
40 TABLET, DELAYED RELEASE ORAL DAILY
Status: DISCONTINUED | OUTPATIENT
Start: 2022-02-23 | End: 2022-02-23

## 2022-02-23 RX ORDER — PANTOPRAZOLE SODIUM 20 MG/1
20 TABLET, DELAYED RELEASE ORAL DAILY
Qty: 30 TABLET | Refills: 0 | Status: SHIPPED | OUTPATIENT
Start: 2022-02-23 | End: 2022-04-08 | Stop reason: ALTCHOICE

## 2022-02-23 RX ORDER — MAG HYDROX/ALUMINUM HYD/SIMETH 200-200-20
30 SUSPENSION, ORAL (FINAL DOSE FORM) ORAL
Status: DISCONTINUED | OUTPATIENT
Start: 2022-02-23 | End: 2022-02-23

## 2022-02-23 RX ORDER — PANTOPRAZOLE SODIUM 40 MG/1
40 TABLET, DELAYED RELEASE ORAL
Status: COMPLETED | OUTPATIENT
Start: 2022-02-23 | End: 2022-02-23

## 2022-02-23 RX ADMIN — ALUMINUM HYDROXIDE, MAGNESIUM HYDROXIDE, AND SIMETHICONE 30 ML: 200; 200; 20 SUSPENSION ORAL at 12:02

## 2022-02-23 RX ADMIN — POTASSIUM CHLORIDE 40 MEQ: 1500 TABLET, EXTENDED RELEASE ORAL at 12:02

## 2022-02-23 RX ADMIN — PANTOPRAZOLE SODIUM 40 MG: 40 TABLET, DELAYED RELEASE ORAL at 12:02

## 2022-02-23 NOTE — ED TRIAGE NOTES
"Pt c/o RUQ abdominal pain and pain after PO intake w/ associated nausea and diarrhea x1 month. "I can't eat anything." "There's a knot in my stomach the size of a banana." Denies chest pain, SOB, constipation, dysuria, or fever.   "

## 2022-02-23 NOTE — DISCHARGE INSTRUCTIONS
You were seen in the emergency department for abdominal pain.  This may be due to stomach irritation or ulcer, which can be worsened by alcohol use.  You also have an enlarged liver, also possibly from alcohol use.  Consider slowly decreasing your alcohol intake.  Follow-up with gastroenterology for further evaluation.  Take pantoprazole as prescribed..  Return to the emergency department for worsening symptoms.

## 2022-02-23 NOTE — ED PROVIDER NOTES
"Encounter Date: 2/22/2022       History     Chief Complaint   Patient presents with    Abdominal Pain     Have big knot to r upper abd with vomiting, today had vodka for lunch and didn't vomit, only vomits food     56M with PMH daily ETOH use presenting with 1 mo of epigastric pain. Patient reports that he has had epigastric pain for the past month, worsened when eating.  He reports that he has stopped eating because it causes pain each time he eats. Associated vomiting. Endorses drinking several alcoholic drinks daily, no change in alcohol consumption.  He also endorses mild cough.  Denies changes in stooling, states that he is stooling "only a small amount because I am eating only a small amount."          Review of patient's allergies indicates:  No Known Allergies  History reviewed. No pertinent past medical history.  Past Surgical History:   Procedure Laterality Date    none       History reviewed. No pertinent family history.  Social History     Tobacco Use    Smoking status: Current Every Day Smoker     Packs/day: 1.00     Years: 42.00     Pack years: 42.00     Types: Cigarettes    Smokeless tobacco: Never Used   Substance Use Topics    Alcohol use: Yes     Comment: social    Drug use: Yes     Types: Marijuana     Review of Systems   Constitutional: Negative for chills, diaphoresis, fatigue, fever and unexpected weight change.   HENT: Negative for congestion, rhinorrhea and sore throat.    Respiratory: Positive for cough. Negative for chest tightness and shortness of breath.    Cardiovascular: Negative for chest pain and palpitations.   Gastrointestinal: Positive for abdominal pain, nausea and vomiting. Negative for constipation and diarrhea.   Genitourinary: Negative for decreased urine volume, difficulty urinating, dysuria, flank pain, frequency, hematuria, penile pain, testicular pain and urgency.   Musculoskeletal: Negative for back pain.   Skin: Negative for color change.   Allergic/Immunologic: " Negative for immunocompromised state.   Neurological: Negative for weakness, light-headedness, numbness and headaches.   Hematological: Does not bruise/bleed easily.       Physical Exam     Initial Vitals [02/22/22 1845]   BP Pulse Resp Temp SpO2   (!) 131/101 109 18 98.5 °F (36.9 °C) 97 %      MAP       --         Physical Exam    Nursing note and vitals reviewed.  Constitutional: He appears well-developed and well-nourished. He is not diaphoretic. No distress.   HENT:   Head: Normocephalic and atraumatic.   Nose: Nose normal.   Eyes: Conjunctivae and EOM are normal. Pupils are equal, round, and reactive to light. No scleral icterus.   Neck: Neck supple.   Normal range of motion.  Cardiovascular: Normal rate, regular rhythm and intact distal pulses.   No murmur heard.  Pulmonary/Chest: No stridor. No respiratory distress. He has no wheezes. He has no rhonchi. He has no rales. He exhibits no tenderness.   Coarse BS bilaterally   Abdominal: Abdomen is soft. Bowel sounds are normal. He exhibits no distension. There is abdominal tenderness (mild epigastric).   +hepatomegaly There is no rebound and no guarding.   Musculoskeletal:         General: No tenderness or edema. Normal range of motion.      Cervical back: Normal range of motion and neck supple.     Lymphadenopathy:     He has no cervical adenopathy.   Neurological: He is alert and oriented to person, place, and time. He has normal strength.   Skin: Skin is warm and dry. Capillary refill takes less than 2 seconds. No rash noted. No pallor.   Psychiatric: He has a normal mood and affect. His behavior is normal. Judgment and thought content normal.         ED Course   Procedures  Labs Reviewed   HEPATITIS C ANTIBODY - Abnormal; Notable for the following components:       Result Value    Hepatitis C Ab Positive (*)     All other components within normal limits    Narrative:     Release to patient->Immediate   ALCOHOL,MEDICAL (ETHANOL) - Abnormal; Notable for the  following components:    Alcohol, Serum 184 (*)     All other components within normal limits   CBC W/ AUTO DIFFERENTIAL - Abnormal; Notable for the following components:    RBC 4.29 (*)      (*)     MCH 35.0 (*)     Immature Granulocytes 0.7 (*)     Gran # (ANC) 8.8 (*)     Immature Grans (Abs) 0.08 (*)     Lymph % 16.7 (*)     All other components within normal limits   COMPREHENSIVE METABOLIC PANEL - Abnormal; Notable for the following components:    Potassium 3.0 (*)     BUN 3 (*)     Total Bilirubin 1.6 (*)     Alkaline Phosphatase 137 (*)      (*)     ALT 71 (*)     All other components within normal limits   URINALYSIS, REFLEX TO URINE CULTURE - Abnormal; Notable for the following components:    Specific Gravity, UA >=1.030 (*)     Occult Blood UA 1+ (*)     All other components within normal limits    Narrative:     Specimen Source->Urine   HIV 1 / 2 ANTIBODY    Narrative:     Release to patient->Immediate   LIPASE   URINALYSIS MICROSCOPIC    Narrative:     Specimen Source->Urine   SARS-COV-2 RDRP GENE          Imaging Results          CT Abdomen Pelvis With Contrast (Final result)  Result time 02/22/22 23:02:25    Final result by Collin Menezes MD (02/22/22 23:02:25)                 Impression:      1. Minimal mucosal thickening of the gastric body and pylorus.  Mild gastritis is a consideration.  2. Diverticulosis of the left colon.  Probable minimal stranding and thickening of the proximal descending colon near the splenic flexure could represent mild acute diverticulitis.  Follow-up recommended.  3. Hepatomegaly with hepatic steatosis.  4. Stable 3 mm pulmonary nodule in the left lower lobe.  5. Left renal cyst.      Electronically signed by: Collin Menezes  Date:    02/22/2022  Time:    23:02             Narrative:    EXAMINATION:  CT ABDOMEN PELVIS WITH CONTRAST    CLINICAL HISTORY:  Epigastric pain;hepatomegaly;    TECHNIQUE:  Low dose axial images, sagittal and coronal reformations  were obtained from the lung bases to the pubic symphysis following the IV administration of 75 mL of Omnipaque 350 .  Oral contrast was not administered.    COMPARISON:  12/03/2020    FINDINGS:  Abdomen:    - Lower thorax:    - Lung bases: 3 mm pulmonary nodule in the left lower lobe on axial 9.  No significant change.    - Liver: The liver is enlarged with diffuse fatty infiltration.  No focal abnormality.    - Gallbladder: No calcified stones are detected.  Minimal probable gallbladder sludge.  No significant wall thickening or pericholecystic fluid.    - Bile Ducts: No evidence of intra or extra hepatic biliary ductal dilation.    - Spleen: Negative.    - Kidneys: Small cyst at the lower pole of the left kidney.  No stone, soft tissue mass, hydronephrosis or hydroureter bilaterally.    - Adrenals: Unremarkable.    - Pancreas: No mass or peripancreatic fat stranding.    - Retroperitoneum:  No significant adenopathy.    - Vascular: No abdominal aortic aneurysm.    - Abdominal wall:  Unremarkable.    Pelvis:    Mild urinary bladder wall thickening.  No focal abnormality.    Bowel/Mesentery:    No evidence of bowel obstruction.  Diverticulosis of the left colon.  Probable minimal stranding and thickening of the proximal descending colon near the splenic flexure could represent mild acute diverticulitis.    No focal abnormality of the stomach.  There is minimal mucosal thickening of the gastric body and pylorus.  Mild gastritis is a consideration.    Bones:  No acute osseous abnormality and no suspicious lytic or blastic lesion.    Bilateral spondylolysis of L5.    Severe disc space narrowing and endplate degenerative changes at L2-3.  Moderate degenerative disc space narrowing elsewhere.  Vacuum disc phenomena is noted in the lower lumbar spine.  Severe bilateral multilevel foraminal narrowing most prominent at L4-5.    Moderate central canal narrowing at L4-5, L3-4 and L2-3.                               X-Ray Chest  "AP Portable (Final result)  Result time 02/22/22 20:32:10    Final result by Naveed Phillip DO (02/22/22 20:32:10)                 Impression:      No acute abnormality.      Electronically signed by: Naveed Phillip  Date:    02/22/2022  Time:    20:32             Narrative:    EXAMINATION:  XR CHEST AP PORTABLE    CLINICAL HISTORY:  Cough, unspecified    TECHNIQUE:  Single frontal view of the chest was performed.    COMPARISON:  12/30/2020.    FINDINGS:  The lungs are well expanded and clear. No focal opacities are seen. The pleural spaces are clear.    The cardiac silhouette is unremarkable.    The visualized osseous structures are unremarkable.                                 Medications   acetaminophen tablet 1,000 mg (1,000 mg Oral Given 2/22/22 2018)   iohexoL (OMNIPAQUE 350) injection 75 mL (75 mLs Intravenous Given 2/22/22 2203)   aluminum-magnesium hydroxide-simethicone 200-200-20 mg/5 mL suspension 30 mL (30 mLs Oral Given 2/23/22 0037)   pantoprazole EC tablet 40 mg (40 mg Oral Given 2/23/22 0037)   potassium chloride SA CR tablet 40 mEq (40 mEq Oral Given 2/23/22 0048)     Medical Decision Making:   Initial Assessment:   On exam patient has coarse breath sounds, normal sclera, abdomen soft and nondistended but significant hepatomegaly appreciated with mild tenderness to palpation in epigastrium.   Differential Diagnosis:     Cirrhosis, hepatitis, cholecystitis, choledocholithiasis, cholelithiasis, gastritis, pancreatitis, malignancy  Clinical Tests:   Lab Tests: Ordered and Reviewed  Radiological Study: Ordered and Reviewed  ED Management:  Labs with positive alcohol, mildly elevated LFTs, CT showing " Liver: The liver is enlarged with diffuse fatty infiltration.  No focal abnormality.  - Gallbladder: No calcified stones are detected.  Minimal probable gallbladder sludge.  No significant wall thickening or pericholecystic fluid.  - Bile Ducts: No evidence of intra or extra hepatic biliary ductal " "dilation.  No focal abnormality of the stomach.  There is minimal mucosal thickening of the gastric body and pylorus.  Mild gastritis is a consideration."    Hepatomegaly noted but no acute lab abnormalities. Referred pt to GI for followup. Stable for d/c, I discussed outpatient follow up and return precautions with pt and answered all questions.                   ED Course as of 03/10/22 1642   Tue Feb 22, 2022 2121 Alcohol, Serum(!): 184 [JR]   2121 Lipase: 35 [JR]   2121 Potassium(!): 3.0 [JR]   2121 BILIRUBIN TOTAL(!): 1.6 [JR]   2121 Alkaline Phosphatase(!): 137 [JR]   2121 AST(!): 194 [JR]   2121 ALT(!): 71 [JR]   2121 Sodium: 140 [JR]   2121 Creatinine: 0.8 [JR]   2121 Lipase: 35 [JR]   2121 SARS-CoV-2 RNA, Amplification, Qual: Negative [JR]   2121 WBC: 11.99 [JR]   2121 Hemoglobin: 15.0 [JR]   2121 X-Ray Chest AP Portable  neg [JR]      ED Course User Index  [JR] Jazz Mancia MD             Clinical Impression:   Final diagnoses:  [R05.9] Cough  [K29.20] Acute alcoholic gastritis without hemorrhage (Primary)          ED Disposition Condition    Discharge Stable        ED Prescriptions     Medication Sig Dispense Start Date End Date Auth. Provider    pantoprazole (PROTONIX) 20 MG tablet Take 1 tablet (20 mg total) by mouth once daily. 30 tablet 2/23/2022 3/25/2022 Jazz Mancia MD        Follow-up Information     Follow up With Specialties Details Why Contact Info Additional Information    [facility] Schedule an appointment as soon as possible for a visit   [address] [address] [phone] [facility] - Main Building, 4th Floor Please park in [facility] and take [facility] elevator           Jazz Mancia MD  03/10/22 1643    "

## 2022-02-24 ENCOUNTER — PES CALL (OUTPATIENT)
Dept: ADMINISTRATIVE | Facility: CLINIC | Age: 57
End: 2022-02-24
Payer: MEDICAID

## 2022-02-24 ENCOUNTER — TELEPHONE (OUTPATIENT)
Dept: GASTROENTEROLOGY | Facility: CLINIC | Age: 57
End: 2022-02-24
Payer: MEDICAID

## 2022-02-24 LAB — HIV 1+2 AB+HIV1 P24 AG SERPL QL IA: NEGATIVE

## 2022-02-24 NOTE — TELEPHONE ENCOUNTER
----- Message from Mana Yañez sent at 2/24/2022  9:52 AM CST -----  Regarding: Appointment  Contact: Jacki/ [phone]  Calling to schedule appointment as soon as possible per ED discharge instructions and referral. Please call and schedule.

## 2022-02-24 NOTE — TELEPHONE ENCOUNTER
Ma spoke with pt   Pt was informed that GI is not accepting any new medicaid patient   Pt was given Number to [facility]

## 2022-02-25 LAB — HCV AB SERPL QL IA: POSITIVE

## 2022-03-02 ENCOUNTER — TELEPHONE (OUTPATIENT)
Dept: EMERGENCY MEDICINE | Facility: OTHER | Age: 57
End: 2022-03-02
Payer: MEDICAID

## 2022-03-02 DIAGNOSIS — R76.8 HEPATITIS C ANTIBODY POSITIVE IN BLOOD: Primary | ICD-10-CM

## 2022-03-24 ENCOUNTER — TELEPHONE (OUTPATIENT)
Dept: ENDOSCOPY | Facility: HOSPITAL | Age: 57
End: 2022-03-24
Payer: MEDICAID

## 2022-03-25 ENCOUNTER — PATIENT MESSAGE (OUTPATIENT)
Dept: ENDOSCOPY | Facility: HOSPITAL | Age: 57
End: 2022-03-25
Payer: MEDICAID

## 2022-03-25 NOTE — TELEPHONE ENCOUNTER
----- Message from Mana Yañez sent at 2/24/2022  9:52 AM CST -----  Regarding: Appointment  Contact: Jacki/ [phone]  Calling to schedule appointment as soon as possible per ED discharge instructions and referral. Please call and schedule.

## 2022-03-26 ENCOUNTER — TELEPHONE (OUTPATIENT)
Dept: ENDOSCOPY | Facility: HOSPITAL | Age: 57
End: 2022-03-26
Payer: MEDICAID

## 2022-03-27 NOTE — TELEPHONE ENCOUNTER
----- Message from Boaz Mauro sent at 3/24/2022 11:53 AM CDT -----  Patient's sister called to speak w/ someone in regards to scheduling from his referral. Requesting callback [phone] Jacki Rivera

## 2022-03-30 ENCOUNTER — PATIENT MESSAGE (OUTPATIENT)
Dept: ENDOSCOPY | Facility: HOSPITAL | Age: 57
End: 2022-03-30
Payer: MEDICAID

## 2022-04-08 ENCOUNTER — OFFICE VISIT (OUTPATIENT)
Dept: GASTROENTEROLOGY | Facility: CLINIC | Age: 57
End: 2022-04-08
Payer: MEDICAID

## 2022-04-08 VITALS
BODY MASS INDEX: 25.79 KG/M2 | HEIGHT: 68 IN | HEART RATE: 46 BPM | WEIGHT: 170.19 LBS | SYSTOLIC BLOOD PRESSURE: 130 MMHG | DIASTOLIC BLOOD PRESSURE: 83 MMHG

## 2022-04-08 DIAGNOSIS — F10.20 CHRONIC ALCOHOLISM: ICD-10-CM

## 2022-04-08 DIAGNOSIS — R10.13 EPIGASTRIC PAIN: Primary | ICD-10-CM

## 2022-04-08 DIAGNOSIS — R76.8 HEPATITIS C ANTIBODY TEST POSITIVE: ICD-10-CM

## 2022-04-08 PROCEDURE — 1159F PR MEDICATION LIST DOCUMENTED IN MEDICAL RECORD: ICD-10-PCS | Mod: CPTII,,, | Performed by: NURSE PRACTITIONER

## 2022-04-08 PROCEDURE — 3079F DIAST BP 80-89 MM HG: CPT | Mod: CPTII,,, | Performed by: NURSE PRACTITIONER

## 2022-04-08 PROCEDURE — 3008F BODY MASS INDEX DOCD: CPT | Mod: CPTII,,, | Performed by: NURSE PRACTITIONER

## 2022-04-08 PROCEDURE — 1159F MED LIST DOCD IN RCRD: CPT | Mod: CPTII,,, | Performed by: NURSE PRACTITIONER

## 2022-04-08 PROCEDURE — 3075F PR MOST RECENT SYSTOLIC BLOOD PRESS GE 130-139MM HG: ICD-10-PCS | Mod: CPTII,,, | Performed by: NURSE PRACTITIONER

## 2022-04-08 PROCEDURE — 99999 PR PBB SHADOW E&M-EST. PATIENT-LVL III: ICD-10-PCS | Mod: PBBFAC,,, | Performed by: NURSE PRACTITIONER

## 2022-04-08 PROCEDURE — 3079F PR MOST RECENT DIASTOLIC BLOOD PRESSURE 80-89 MM HG: ICD-10-PCS | Mod: CPTII,,, | Performed by: NURSE PRACTITIONER

## 2022-04-08 PROCEDURE — 1160F RVW MEDS BY RX/DR IN RCRD: CPT | Mod: CPTII,,, | Performed by: NURSE PRACTITIONER

## 2022-04-08 PROCEDURE — 99203 OFFICE O/P NEW LOW 30 MIN: CPT | Mod: S$PBB,,, | Performed by: NURSE PRACTITIONER

## 2022-04-08 PROCEDURE — 99999 PR PBB SHADOW E&M-EST. PATIENT-LVL III: CPT | Mod: PBBFAC,,, | Performed by: NURSE PRACTITIONER

## 2022-04-08 PROCEDURE — 99203 PR OFFICE/OUTPT VISIT, NEW, LEVL III, 30-44 MIN: ICD-10-PCS | Mod: S$PBB,,, | Performed by: NURSE PRACTITIONER

## 2022-04-08 PROCEDURE — 1160F PR REVIEW ALL MEDS BY PRESCRIBER/CLIN PHARMACIST DOCUMENTED: ICD-10-PCS | Mod: CPTII,,, | Performed by: NURSE PRACTITIONER

## 2022-04-08 PROCEDURE — 99213 OFFICE O/P EST LOW 20 MIN: CPT | Mod: PBBFAC,PO | Performed by: NURSE PRACTITIONER

## 2022-04-08 PROCEDURE — 3008F PR BODY MASS INDEX (BMI) DOCUMENTED: ICD-10-PCS | Mod: CPTII,,, | Performed by: NURSE PRACTITIONER

## 2022-04-08 PROCEDURE — 3075F SYST BP GE 130 - 139MM HG: CPT | Mod: CPTII,,, | Performed by: NURSE PRACTITIONER

## 2022-04-08 RX ORDER — OMEPRAZOLE 40 MG/1
40 CAPSULE, DELAYED RELEASE ORAL DAILY
Qty: 30 CAPSULE | Refills: 2 | Status: SHIPPED | OUTPATIENT
Start: 2022-04-08 | End: 2023-04-08

## 2022-04-08 NOTE — PROGRESS NOTES
"Subjective:       Patient ID: Kenneth Mayorga is a 56 y.o. male.    Chief Complaint: Abdominal Pain    57 y/o male with hx of chronic alcohol abuse presents to clinic for hospital follow up. Patient's sister is present for visit today. Presented to [facility] ED 2/22 with c/o abdominal pain. Imaging and labs significant for hepatomegaly, elevated liver enzymes, and hep C Ab (+). Reports stopping pantoprazole after 3 days 2/2 dizziness. He continues to consume alcohol for pain relief. Has dull ache to upper abdomen and nausea worse after food. Denies vomiting, blood in stool, or melena.       History reviewed. No pertinent past medical history.    Past Surgical History:   Procedure Laterality Date    none         History reviewed. No pertinent family history.    Social History     Socioeconomic History    Marital status: Single   Tobacco Use    Smoking status: Current Every Day Smoker     Packs/day: 1.00     Years: 42.00     Pack years: 42.00     Types: Cigarettes    Smokeless tobacco: Never Used   Substance and Sexual Activity    Alcohol use: Yes     Comment: social    Drug use: Yes     Types: Marijuana    Sexual activity: Not Currently       Review of Systems   Constitutional: Positive for appetite change. Negative for fever and unexpected weight change.   HENT: Negative for trouble swallowing.    Respiratory: Negative for shortness of breath.    Cardiovascular: Negative for chest pain.   Gastrointestinal: Positive for abdominal pain and nausea. Negative for blood in stool, constipation, diarrhea and vomiting.   Neurological: Negative for dizziness and weakness.   Hematological: Negative for adenopathy. Does not bruise/bleed easily.   Psychiatric/Behavioral: Negative for dysphoric mood.         Objective:     Vitals:    04/08/22 0908   BP: 130/83   Pulse: (!) 46   Weight: 77.2 kg (170 lb 3.2 oz)   Height: 5' 8" (1.727 m)     Component      Latest Ref Rng & Units 2/22/2022   WBC      3.90 - 12.70 K/uL 11.99 "   RBC      4.60 - 6.20 M/uL 4.29 (L)   Hemoglobin      14.0 - 18.0 g/dL 15.0   Hematocrit      40.0 - 54.0 % 43.2   MCV      82 - 98 fL 101 (H)   MCH      27.0 - 31.0 pg 35.0 (H)   MCHC      32.0 - 36.0 g/dL 34.7   RDW      11.5 - 14.5 % 12.7   Platelets      150 - 450 K/uL 217   MPV      9.2 - 12.9 fL 10.5   Immature Granulocytes      0.0 - 0.5 % 0.7 (H)   Gran # (ANC)      1.8 - 7.7 K/uL 8.8 (H)   Immature Grans (Abs)      0.00 - 0.04 K/uL 0.08 (H)   Lymph #      1.0 - 4.8 K/uL 2.0   Mono #      0.3 - 1.0 K/uL 1.0   Eos #      0.0 - 0.5 K/uL 0.1   Baso #      0.00 - 0.20 K/uL 0.08   nRBC      0 /100 WBC 0   Gran %      38.0 - 73.0 % 72.9   Lymph %      18.0 - 48.0 % 16.7 (L)   Mono %      4.0 - 15.0 % 8.3   Eosinophil %      0.0 - 8.0 % 0.7   Basophil %      0.0 - 1.9 % 0.7   Differential Method       Automated   Sodium      136 - 145 mmol/L 140   Potassium      3.5 - 5.1 mmol/L 3.0 (L)   Chloride      95 - 110 mmol/L 97   CO2      23 - 29 mmol/L 27   Glucose      70 - 110 mg/dL 106   BUN      6 - 20 mg/dL 3 (L)   Creatinine      0.5 - 1.4 mg/dL 0.8   Calcium      8.7 - 10.5 mg/dL 9.0   PROTEIN TOTAL      6.0 - 8.4 g/dL 8.1   Albumin      3.5 - 5.2 g/dL 3.6   BILIRUBIN TOTAL      0.1 - 1.0 mg/dL 1.6 (H)   Alkaline Phosphatase      55 - 135 U/L 137 (H)   AST      10 - 40 U/L 194 (H)   ALT      10 - 44 U/L 71 (H)   Anion Gap      8 - 16 mmol/L 16   eGFR if African American      >60 mL/min/1.73 m:2 >60.0   eGFR if non African American      >60 mL/min/1.73 m:2 >60.0   Hepatitis C Ab      Negative Positive (A)   Alcohol, Serum      <10 mg/dL 184 (H)   Lipase      4 - 60 U/L 35        Physical Exam  Constitutional:       General: He is not in acute distress.     Appearance: Normal appearance. He is not ill-appearing.   HENT:      Head: Normocephalic.   Eyes:      Conjunctiva/sclera: Conjunctivae normal.      Pupils: Pupils are equal, round, and reactive to light.   Pulmonary:      Effort: Pulmonary effort is normal. No  respiratory distress.   Musculoskeletal:         General: Normal range of motion.      Cervical back: Normal range of motion.   Skin:     General: Skin is warm and dry.   Neurological:      Mental Status: He is alert and oriented to person, place, and time.   Psychiatric:         Mood and Affect: Mood normal.         Behavior: Behavior normal.               Assessment:         ICD-10-CM ICD-9-CM   1. Epigastric pain  R10.13 789.06   2. Hepatitis C antibody test positive  R76.8 795.79   3. Chronic alcoholism  F10.20 303.90       Plan:       Epigastric pain  -     Case Request Endoscopy: EGD (ESOPHAGOGASTRODUODENOSCOPY)  -     omeprazole (PRILOSEC) 40 MG capsule; Take 1 capsule (40 mg total) by mouth once daily.  Dispense: 30 capsule; Refill: 2    Hepatitis C antibody test positive  -     HEPATITIS C RNA, QUANTITATIVE, PCR; Future; Expected date: 04/08/2022    Chronic alcoholism         -    Patient declined rehab or AA information. Encouraged to stop drinking    Follow up if symptoms worsen or fail to improve.     Patient's Medications   New Prescriptions    OMEPRAZOLE (PRILOSEC) 40 MG CAPSULE    Take 1 capsule (40 mg total) by mouth once daily.   Previous Medications    IPRATROPIUM-ALBUTEROL (COMBIVENT)  MCG/ACTUATION INHALER    Inhale 1 puff into the lungs every 4 (four) hours as needed for Wheezing or Shortness of Breath. Rescue   Modified Medications    No medications on file   Discontinued Medications    PANTOPRAZOLE (PROTONIX) 20 MG TABLET    Take 1 tablet (20 mg total) by mouth once daily.

## 2022-04-08 NOTE — PATIENT INSTRUCTIONS
EGD Prep Instructions    [facility] [address] [place]  [address]    You are scheduled for an EGD with Dr. Gutierrez on 4/12/2022 at [facility].  You will enter through the [facility] entrance and check in at Same Day Surgery.    Nothing to eat or drink after midnight before the procedure.  You MAY brush your teeth.    You MAY take your blood pressure, heart, and seizure medication on the morning of the procedure, with a SIP of water.  Hold ALL other medications until after the procedure.    You must have someone with you to DRIVE YOU HOME since you will be receiving IV sedation for the procedure.    If you are on blood thinners THAT YOU HAVE BEEN INSTRUCTED TO HOLD BY YOUR DOCTOR FOR THIS PROCEDURE, then do NOT take this the morning of your EGD.  Do NOT stop these medications on your own, they must be approved to be held by your doctor.  Your EGD can NOT be done if you are on these medications.  Examples of blood thinners include: Coumadin, Aggrenox, Plavix, Pradaxa, Reapro, Pletal, Xarelto, Ticagrelor, Brilinta, Eliquis, and high dose aspirin (325 mg).  You do not have to stop baby aspirin 81 mg.    You will receive a call a few days before your EGD to tell you the time to arrive.  If you have not received a call by the day before your procedure, call the Pre-op Coordinator at [phone].

## 2022-04-26 DIAGNOSIS — R76.8 HEPATITIS C ANTIBODY TEST POSITIVE: Primary | ICD-10-CM

## 2022-04-27 ENCOUNTER — TELEPHONE (OUTPATIENT)
Dept: HEPATOLOGY | Facility: CLINIC | Age: 57
End: 2022-04-27
Payer: MEDICAID

## 2022-04-27 NOTE — TELEPHONE ENCOUNTER
JOVAN Baires ordered that patient be scheduled for a hep c consult visit.  Patient antibody positive but quant negative on 4/8/22.  Please advise.

## 2022-04-28 ENCOUNTER — TELEPHONE (OUTPATIENT)
Dept: GASTROENTEROLOGY | Facility: CLINIC | Age: 57
End: 2022-04-28
Payer: MEDICAID

## 2022-04-28 NOTE — TELEPHONE ENCOUNTER
I spoke with patient's sister Jacki.  She handles all scheduling for him.  Appt with SHANEL Mayfield scheduled 5/2/22.

## 2022-04-28 NOTE — TELEPHONE ENCOUNTER
----- Message from Guillermina Gutierrez MD sent at 4/26/2022  6:56 AM CDT -----  Chemical gastropathy c/w alcohol induced gastritis, HP (-).  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  Cont PPI, repeat EGD in 1 year with [facility] 3D, stop alcohol intake and seek out AA.

## 2022-04-28 NOTE — TELEPHONE ENCOUNTER
Chart reviewed  abnl liver panel, (+) HCVAB, neg HCV RNA    Pt needs general hepatology appt (Not HCV clinic) - pls schedule ([place] is fine)  thanks

## 2022-05-02 ENCOUNTER — OFFICE VISIT (OUTPATIENT)
Dept: HEPATOLOGY | Facility: CLINIC | Age: 57
End: 2022-05-02
Payer: MEDICAID

## 2022-05-02 ENCOUNTER — LAB VISIT (OUTPATIENT)
Dept: LAB | Facility: HOSPITAL | Age: 57
End: 2022-05-02
Payer: MEDICAID

## 2022-05-02 VITALS
TEMPERATURE: 99 F | HEART RATE: 95 BPM | OXYGEN SATURATION: 98 % | SYSTOLIC BLOOD PRESSURE: 139 MMHG | BODY MASS INDEX: 27.46 KG/M2 | HEIGHT: 68 IN | RESPIRATION RATE: 18 BRPM | DIASTOLIC BLOOD PRESSURE: 90 MMHG | WEIGHT: 181.19 LBS

## 2022-05-02 DIAGNOSIS — K76.0 FATTY LIVER: ICD-10-CM

## 2022-05-02 DIAGNOSIS — R74.8 ELEVATED LIVER ENZYMES: ICD-10-CM

## 2022-05-02 DIAGNOSIS — F10.20 ALCOHOL USE DISORDER, SEVERE, DEPENDENCE: ICD-10-CM

## 2022-05-02 DIAGNOSIS — R76.8 HEPATITIS C ANTIBODY TEST POSITIVE: ICD-10-CM

## 2022-05-02 DIAGNOSIS — F10.20 CHRONIC ALCOHOLISM: Primary | ICD-10-CM

## 2022-05-02 LAB
A1AT SERPL-MCNC: 212 MG/DL (ref 100–190)
AFP SERPL-MCNC: 2.7 NG/ML (ref 0–8.4)
ALBUMIN SERPL BCP-MCNC: 3.1 G/DL (ref 3.5–5.2)
ALP SERPL-CCNC: 147 U/L (ref 55–135)
ALT SERPL W/O P-5'-P-CCNC: 32 U/L (ref 10–44)
ANION GAP SERPL CALC-SCNC: 8 MMOL/L (ref 8–16)
AST SERPL-CCNC: 97 U/L (ref 10–40)
BASOPHILS # BLD AUTO: 0.11 K/UL (ref 0–0.2)
BASOPHILS NFR BLD: 1.1 % (ref 0–1.9)
BILIRUB SERPL-MCNC: 1.1 MG/DL (ref 0.1–1)
BUN SERPL-MCNC: 5 MG/DL (ref 6–20)
CALCIUM SERPL-MCNC: 9.2 MG/DL (ref 8.7–10.5)
CERULOPLASMIN SERPL-MCNC: 32 MG/DL (ref 15–45)
CHLORIDE SERPL-SCNC: 100 MMOL/L (ref 95–110)
CK SERPL-CCNC: 114 U/L (ref 20–200)
CO2 SERPL-SCNC: 29 MMOL/L (ref 23–29)
CREAT SERPL-MCNC: 0.7 MG/DL (ref 0.5–1.4)
DIFFERENTIAL METHOD: ABNORMAL
EOSINOPHIL # BLD AUTO: 0.1 K/UL (ref 0–0.5)
EOSINOPHIL NFR BLD: 1.1 % (ref 0–8)
ERYTHROCYTE [DISTWIDTH] IN BLOOD BY AUTOMATED COUNT: 13.3 % (ref 11.5–14.5)
EST. GFR  (AFRICAN AMERICAN): >60 ML/MIN/1.73 M^2
EST. GFR  (NON AFRICAN AMERICAN): >60 ML/MIN/1.73 M^2
FERRITIN SERPL-MCNC: 1098 NG/ML (ref 20–300)
GLUCOSE SERPL-MCNC: 84 MG/DL (ref 70–110)
HCT VFR BLD AUTO: 40.6 % (ref 40–54)
HGB BLD-MCNC: 13.5 G/DL (ref 14–18)
IGG SERPL-MCNC: 1658 MG/DL (ref 650–1600)
IMM GRANULOCYTES # BLD AUTO: 0.04 K/UL (ref 0–0.04)
IMM GRANULOCYTES NFR BLD AUTO: 0.4 % (ref 0–0.5)
INR PPP: 1.2 (ref 0.8–1.2)
IRON SERPL-MCNC: 77 UG/DL (ref 45–160)
LYMPHOCYTES # BLD AUTO: 1.2 K/UL (ref 1–4.8)
LYMPHOCYTES NFR BLD: 11.3 % (ref 18–48)
MCH RBC QN AUTO: 34.4 PG (ref 27–31)
MCHC RBC AUTO-ENTMCNC: 33.3 G/DL (ref 32–36)
MCV RBC AUTO: 104 FL (ref 82–98)
MONOCYTES # BLD AUTO: 1.1 K/UL (ref 0.3–1)
MONOCYTES NFR BLD: 10.8 % (ref 4–15)
NEUTROPHILS # BLD AUTO: 7.8 K/UL (ref 1.8–7.7)
NEUTROPHILS NFR BLD: 75.3 % (ref 38–73)
NRBC BLD-RTO: 0 /100 WBC
PLATELET # BLD AUTO: 176 K/UL (ref 150–450)
PMV BLD AUTO: 10.5 FL (ref 9.2–12.9)
POTASSIUM SERPL-SCNC: 3.5 MMOL/L (ref 3.5–5.1)
PROT SERPL-MCNC: 7.4 G/DL (ref 6–8.4)
PROTHROMBIN TIME: 12.1 SEC (ref 9–12.5)
RBC # BLD AUTO: 3.92 M/UL (ref 4.6–6.2)
SATURATED IRON: 27 % (ref 20–50)
SODIUM SERPL-SCNC: 137 MMOL/L (ref 136–145)
TOTAL IRON BINDING CAPACITY: 283 UG/DL (ref 250–450)
TRANSFERRIN SERPL-MCNC: 191 MG/DL (ref 200–375)
VIT B12 SERPL-MCNC: 1246 PG/ML (ref 210–950)
WBC # BLD AUTO: 10.29 K/UL (ref 3.9–12.7)

## 2022-05-02 PROCEDURE — 1160F RVW MEDS BY RX/DR IN RCRD: CPT | Mod: CPTII,,, | Performed by: PHYSICIAN ASSISTANT

## 2022-05-02 PROCEDURE — 82550 ASSAY OF CK (CPK): CPT | Performed by: PHYSICIAN ASSISTANT

## 2022-05-02 PROCEDURE — 85610 PROTHROMBIN TIME: CPT | Performed by: PHYSICIAN ASSISTANT

## 2022-05-02 PROCEDURE — 82103 ALPHA-1-ANTITRYPSIN TOTAL: CPT | Performed by: PHYSICIAN ASSISTANT

## 2022-05-02 PROCEDURE — 82784 ASSAY IGA/IGD/IGG/IGM EACH: CPT | Performed by: PHYSICIAN ASSISTANT

## 2022-05-02 PROCEDURE — 3080F PR MOST RECENT DIASTOLIC BLOOD PRESSURE >= 90 MM HG: ICD-10-PCS | Mod: CPTII,,, | Performed by: PHYSICIAN ASSISTANT

## 2022-05-02 PROCEDURE — 85025 COMPLETE CBC W/AUTO DIFF WBC: CPT | Performed by: PHYSICIAN ASSISTANT

## 2022-05-02 PROCEDURE — 80321 ALCOHOLS BIOMARKERS 1OR 2: CPT | Performed by: PHYSICIAN ASSISTANT

## 2022-05-02 PROCEDURE — 82390 ASSAY OF CERULOPLASMIN: CPT | Performed by: PHYSICIAN ASSISTANT

## 2022-05-02 PROCEDURE — 86704 HEP B CORE ANTIBODY TOTAL: CPT | Performed by: PHYSICIAN ASSISTANT

## 2022-05-02 PROCEDURE — 99999 PR PBB SHADOW E&M-EST. PATIENT-LVL IV: ICD-10-PCS | Mod: PBBFAC,,, | Performed by: PHYSICIAN ASSISTANT

## 2022-05-02 PROCEDURE — 86256 FLUORESCENT ANTIBODY TITER: CPT | Performed by: PHYSICIAN ASSISTANT

## 2022-05-02 PROCEDURE — 1159F PR MEDICATION LIST DOCUMENTED IN MEDICAL RECORD: ICD-10-PCS | Mod: CPTII,,, | Performed by: PHYSICIAN ASSISTANT

## 2022-05-02 PROCEDURE — 87340 HEPATITIS B SURFACE AG IA: CPT | Performed by: PHYSICIAN ASSISTANT

## 2022-05-02 PROCEDURE — 1159F MED LIST DOCD IN RCRD: CPT | Mod: CPTII,,, | Performed by: PHYSICIAN ASSISTANT

## 2022-05-02 PROCEDURE — 3075F PR MOST RECENT SYSTOLIC BLOOD PRESS GE 130-139MM HG: ICD-10-PCS | Mod: CPTII,,, | Performed by: PHYSICIAN ASSISTANT

## 2022-05-02 PROCEDURE — 99999 PR PBB SHADOW E&M-EST. PATIENT-LVL IV: CPT | Mod: PBBFAC,,, | Performed by: PHYSICIAN ASSISTANT

## 2022-05-02 PROCEDURE — 3008F BODY MASS INDEX DOCD: CPT | Mod: CPTII,,, | Performed by: PHYSICIAN ASSISTANT

## 2022-05-02 PROCEDURE — 82728 ASSAY OF FERRITIN: CPT | Performed by: PHYSICIAN ASSISTANT

## 2022-05-02 PROCEDURE — 86706 HEP B SURFACE ANTIBODY: CPT | Performed by: PHYSICIAN ASSISTANT

## 2022-05-02 PROCEDURE — 86235 NUCLEAR ANTIGEN ANTIBODY: CPT | Mod: 91 | Performed by: PHYSICIAN ASSISTANT

## 2022-05-02 PROCEDURE — 84466 ASSAY OF TRANSFERRIN: CPT | Performed by: PHYSICIAN ASSISTANT

## 2022-05-02 PROCEDURE — 3008F PR BODY MASS INDEX (BMI) DOCUMENTED: ICD-10-PCS | Mod: CPTII,,, | Performed by: PHYSICIAN ASSISTANT

## 2022-05-02 PROCEDURE — 86038 ANTINUCLEAR ANTIBODIES: CPT | Performed by: PHYSICIAN ASSISTANT

## 2022-05-02 PROCEDURE — 99214 OFFICE O/P EST MOD 30 MIN: CPT | Mod: PBBFAC | Performed by: PHYSICIAN ASSISTANT

## 2022-05-02 PROCEDURE — 1160F PR REVIEW ALL MEDS BY PRESCRIBER/CLIN PHARMACIST DOCUMENTED: ICD-10-PCS | Mod: CPTII,,, | Performed by: PHYSICIAN ASSISTANT

## 2022-05-02 PROCEDURE — 86790 VIRUS ANTIBODY NOS: CPT | Performed by: PHYSICIAN ASSISTANT

## 2022-05-02 PROCEDURE — 86039 ANTINUCLEAR ANTIBODIES (ANA): CPT | Performed by: PHYSICIAN ASSISTANT

## 2022-05-02 PROCEDURE — 82105 ALPHA-FETOPROTEIN SERUM: CPT | Performed by: PHYSICIAN ASSISTANT

## 2022-05-02 PROCEDURE — 99204 PR OFFICE/OUTPT VISIT, NEW, LEVL IV, 45-59 MIN: ICD-10-PCS | Mod: S$PBB,,, | Performed by: PHYSICIAN ASSISTANT

## 2022-05-02 PROCEDURE — 3075F SYST BP GE 130 - 139MM HG: CPT | Mod: CPTII,,, | Performed by: PHYSICIAN ASSISTANT

## 2022-05-02 PROCEDURE — 86235 NUCLEAR ANTIGEN ANTIBODY: CPT | Mod: 59 | Performed by: PHYSICIAN ASSISTANT

## 2022-05-02 PROCEDURE — 3080F DIAST BP >= 90 MM HG: CPT | Mod: CPTII,,, | Performed by: PHYSICIAN ASSISTANT

## 2022-05-02 PROCEDURE — 99204 OFFICE O/P NEW MOD 45 MIN: CPT | Mod: S$PBB,,, | Performed by: PHYSICIAN ASSISTANT

## 2022-05-02 PROCEDURE — 80053 COMPREHEN METABOLIC PANEL: CPT | Performed by: PHYSICIAN ASSISTANT

## 2022-05-02 PROCEDURE — 82607 VITAMIN B-12: CPT | Performed by: PHYSICIAN ASSISTANT

## 2022-05-02 NOTE — PROGRESS NOTES
"Hepatology Consultation: [facility]    NEW PATIENT  PCP: Primary Doctor No    Subjective      Mr. Mayorga is a 57 y.o. male with PMH as listed below who presents to clinic for evaluation of elevated liver enzymes.     Lab Results   Component Value Date    ALT 71 (H) 02/22/2022     (H) 02/22/2022    ALKPHOS 137 (H) 02/22/2022    BILITOT 1.6 (H) 02/22/2022    ALBUMIN 3.6 02/22/2022     02/22/2022       Initial history 05/02/2022  Arrives today with sister, who also has cirrhosis.   Reports significant history of drinking "to help with his abdominal pain." Drinks about 1 pint of gin per day. Denies symptoms of hepatic decompensation including jaundice, ascites, cognitive problems to suggest hepatic encephalopathy, or GI bleeding.     Reports significant epigastric abdominal pain for over two months. Recent EGD with GI 4/2022, is not taking pantoprazole. Difficult eating because of pain.    Has left leg pain and weakness x 1 year.   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 Living with mother at this time in [place].     No prior history of liver disease or visits with liver specailists.       Supplements: none  New rx: pantoprazole   ETOH: gin daily 1 pint per day last couple; prior beer and wine use   Smoking:Smoke weed and cigarettes   Illicit substances: denies   Social: staying with mother   Occupation: disabled currently   Prior liver biopsy: no   Prior liver disease: no   Hepatitis vaccination: no  Liver-related family medical history: sister with cirrhosis, "end stage"        PMH Kenneth has a past medical history of Chronic alcohol abuse.   PSXH Kenneth has a past surgical history that includes none; Repair of ligament (Left); and Esophagogastroduodenoscopy (N/A, 4/12/2022).    Kenneth's family history is not on file.    Kenneth reports that he has been smoking cigarettes. He has a 42.00 pack-year smoking history. He has " "never used smokeless tobacco. He reports current alcohol use. He reports current drug use. Drug: Marijuana.   JOYCE Cooper has No Known Allergies.   ARTHUR Cooper has a current medication list which includes the following prescription(s): ipratropium-albuterol and omeprazole.       ROS:   GENERAL: Denies fatigue  HEENT: Denies yellowing of eyes   CARDIOVASCULAR: Denies edema  GI: Denies abdominal pain  SKIN: Denies rash, itching   NEURO: Denies confusion, memory loss, or mood changes  HEME/LYMPH: Denies easy bruising or bleeding      Objective     BP (!) 139/90 (BP Location: Right arm, Patient Position: Sitting, BP Method: Large (Automatic))   Pulse 95   Temp 98.5 °F (36.9 °C) (Oral)   Resp 18   Ht 5' 8" (1.727 m)   Wt 82.2 kg (181 lb 3.5 oz)   SpO2 98%   BMI 27.55 kg/m²     PHYSICAL EXAM:   Friendly man, in no acute distress; alert and oriented to person, place and time  EYES: Sclerae anicteric  GI: Soft, non-tender, non-distended. No ascites. Tenderness to palpation of epigastrum.  EXTREMITIES:  No edema.  SKIN: Warm and dry. No jaundice. No telangectasias noted. No palmar erythema.  NEURO:  Abnormal gait. No asterixis.  PSYCH:  Memory intact. Thought and speech pattern appropriate. Behavior normal      DIAGNOSTICS  Lab Results   Component Value Date    ALT 71 (H) 02/22/2022     (H) 02/22/2022    ALKPHOS 137 (H) 02/22/2022    BILITOT 1.6 (H) 02/22/2022    ALBUMIN 3.6 02/22/2022     02/22/2022     No results found for: AFP    In relation to metabolic risk factors:   No results found for: LABA1C, HGBA1C, CHOL, TSH  Body mass index is 27.55 kg/m².      Prior serologic workup:   Lab Results   Component Value Date    HEPCAB Positive (A) 02/22/2022       Imaging:  CT Abdomen Pelvis With Contrast  Narrative: EXAMINATION:  CT ABDOMEN PELVIS WITH CONTRAST    CLINICAL HISTORY:  Epigastric pain;hepatomegaly;    TECHNIQUE:  Low dose axial images, sagittal and coronal reformations were obtained from the lung " bases to the pubic symphysis following the IV administration of 75 mL of Omnipaque 350 .  Oral contrast was not administered.    COMPARISON:  12/03/2020    FINDINGS:  Abdomen:    - Lower thorax:    - Lung bases: 3 mm pulmonary nodule in the left lower lobe on axial 9.  No significant change.    - Liver: The liver is enlarged with diffuse fatty infiltration.  No focal abnormality.    - Gallbladder: No calcified stones are detected.  Minimal probable gallbladder sludge.  No significant wall thickening or pericholecystic fluid.    - Bile Ducts: No evidence of intra or extra hepatic biliary ductal dilation.    - Spleen: Negative.    - Kidneys: Small cyst at the lower pole of the left kidney.  No stone, soft tissue mass, hydronephrosis or hydroureter bilaterally.    - Adrenals: Unremarkable.    - Pancreas: No mass or peripancreatic fat stranding.    - Retroperitoneum:  No significant adenopathy.    - Vascular: No abdominal aortic aneurysm.    - Abdominal wall:  Unremarkable.    Pelvis:    Mild urinary bladder wall thickening.  No focal abnormality.    Bowel/Mesentery:    No evidence of bowel obstruction.  Diverticulosis of the left colon.  Probable minimal stranding and thickening of the proximal descending colon near the splenic flexure could represent mild acute diverticulitis.    No focal abnormality of the stomach.  There is minimal mucosal thickening of the gastric body and pylorus.  Mild gastritis is a consideration.    Bones:  No acute osseous abnormality and no suspicious lytic or blastic lesion.    Bilateral spondylolysis of L5.    Severe disc space narrowing and endplate degenerative changes at L2-3.  Moderate degenerative disc space narrowing elsewhere.  Vacuum disc phenomena is noted in the lower lumbar spine.  Severe bilateral multilevel foraminal narrowing most prominent at L4-5.    Moderate central canal narrowing at L4-5, L3-4 and L2-3.  Impression: 1. Minimal mucosal thickening of the gastric body and  pylorus.  Mild gastritis is a consideration.  2. Diverticulosis of the left colon.  Probable minimal stranding and thickening of the proximal descending colon near the splenic flexure could represent mild acute diverticulitis.  Follow-up recommended.  3. Hepatomegaly with hepatic steatosis.  4. Stable 3 mm pulmonary nodule in the left lower lobe.  5. Left renal cyst.    Electronically signed by: Collin Miguel  Date:    02/22/2022  Time:    23:02      Assessment/Plan     57 y.o. male with:    1. Hepatitis C antibody test positive  - undetectable RNA   - recommend HBV screening  - HIV screening negative     2. Hepatomegaly  3. Fatty liver  4. Elevated liver enzymes  - suspect alcohol    5. Alcohol use disorder, severe, dependence  - recommend detoxification and inpatient admission   - resources given for [place] area    6. Abdominal pain, epigastric  - f/u with gastroenterology, take pantoprazole, avoid alcohol and NSAIDs  - discussed ER precautions  - hydration recommended   - if unable to hydrate at home I recommended ER admission  - vital signs stable, no distress, afebrile  - no GI bleeding reported       Orders Placed This Encounter   Procedures    US Abdomen Limited    AFP Tumor Marker    CBC Auto Differential    Protime-INR    Phosphatidylethanol (PETH)    Comprehensive Metabolic Panel    VITAMIN B12    Alpha-1-Antitrypsin    LUZ Screen w/Reflex    Antimitochondrial Antibody    Anti-Smooth Muscle Antibody    Ceruloplasmin    CK    Ferritin    IgG    Iron and TIBC    Hepatitis B Surface Antigen    Hepatitis B Surface Ab, Qualitative    Hepatitis A antibody, IgG    Hepatitis B Core Antibody, Total         · Labs today, ultrasound soon   · Fibroscan in the future, once extended sobriety.  · F/u in [place] clinic. Establish care with PCP   · Recommend inpatient detoxification admission. Do not recommend cold stop.  · F/u with GI regarding abdominal pain. ER precautions given in the  near-term.  · Follow up in about 8 weeks (around 6/27/2022).      I spent 45 minutes on the day of this encounter preparing for, evaluating, treating, and managing this patient.     Thank you for allowing me to participate in the care of TERRY CampoverdeC  Hepatology & Liver Transplant

## 2022-05-02 NOTE — PATIENT INSTRUCTIONS
Recommend inpatient detoxification for alcohol use   Follow-up to gastroenterology  Recommend labs today   Recommend protein shakes daily  Ultrasound soon   Follow-up in 2 months   Establish care with alcohol use therapist.   Tylenol is okay, once you are stopping the alcohol.  No raw oysters.

## 2022-05-03 LAB
ANA PATTERN 1: NORMAL
ANA SER QL IF: POSITIVE
ANA TITR SER IF: NORMAL {TITER}

## 2022-05-04 LAB
ANTI SM ANTIBODY: 0.08 RATIO (ref 0–0.99)
ANTI SM/RNP ANTIBODY: 0.1 RATIO (ref 0–0.99)
ANTI-SM INTERPRETATION: NEGATIVE
ANTI-SM/RNP INTERPRETATION: NEGATIVE
ANTI-SSA ANTIBODY: 0.24 RATIO (ref 0–0.99)
ANTI-SSA INTERPRETATION: NEGATIVE
ANTI-SSB ANTIBODY: 0.06 RATIO (ref 0–0.99)
ANTI-SSB INTERPRETATION: NEGATIVE
DSDNA AB SER-ACNC: NORMAL [IU]/ML
MITOCHONDRIA AB TITR SER IF: NORMAL {TITER}

## 2022-05-05 LAB — SMOOTH MUSCLE AB TITR SER IF: NORMAL {TITER}

## 2022-05-06 ENCOUNTER — TELEPHONE (OUTPATIENT)
Dept: GASTROENTEROLOGY | Facility: CLINIC | Age: 57
End: 2022-05-06
Payer: MEDICAID

## 2022-05-06 LAB
HAV IGG SER QL IA: POSITIVE
HBV CORE AB SERPL QL IA: POSITIVE
HBV SURFACE AB SER-ACNC: POSITIVE M[IU]/ML
HBV SURFACE AG SERPL QL IA: NEGATIVE
PETH 16:0/18.1 (POPETH): >2000 NG/ML
PETH 16:0/18.2 (PLPETH): 1479 NG/ML

## 2022-05-06 NOTE — LETTER
May 6, 2022    Kenneth Mayorga  [address] [address]             [facility] - Gastroenter[facility]  [address] [place]   [facility] [address]  Phone: [phone]  Fax: [phone] Dear Mr. Mayorga:    We have attempted to contact you regarding your test results. Please contact the office to discuss at [phone].      If you have any questions or concerns, please don't hesitate to call.    Sincerely,        Guillermina Gutierrez MD

## 2022-05-11 ENCOUNTER — TELEPHONE (OUTPATIENT)
Dept: GASTROENTEROLOGY | Facility: CLINIC | Age: 57
End: 2022-05-11
Payer: MEDICAID

## 2022-05-11 ENCOUNTER — TELEPHONE (OUTPATIENT)
Dept: HEPATOLOGY | Facility: CLINIC | Age: 57
End: 2022-05-11
Payer: MEDICAID

## 2022-05-11 NOTE — TELEPHONE ENCOUNTER
Prior serologic workup:   Lab Results   Component Value Date    SMOOTHMUSCAB Negative 1:40 05/02/2022    AMAIFA Negative 1:40 05/02/2022    IGGSERUM 1658 (H) 05/02/2022    ANASCREEN Positive (A) 05/02/2022    FERRITIN 1,098 (H) 05/02/2022    FESATURATED 27 05/02/2022    CERULOPLSM 32.0 05/02/2022    HEPBSAG Negative 05/02/2022    HEPCAB Positive (A) 02/22/2022       Lab Results   Component Value Date    ALT 32 05/02/2022    AST 97 (H) 05/02/2022    ALKPHOS 147 (H) 05/02/2022    BILITOT 1.1 (H) 05/02/2022    ALBUMIN 3.1 (L) 05/02/2022    INR 1.2 05/02/2022     05/02/2022       MELD-Na score: 9 at 5/2/2022  3:18 PM  MELD score: 9 at 5/2/2022  3:18 PM  Calculated from:  Serum Creatinine: 0.7 mg/dL (Using min of 1 mg/dL) at 5/2/2022  3:18 PM  Serum Sodium: 137 mmol/L at 5/2/2022  3:18 PM  Total Bilirubin: 1.1 mg/dL at 5/2/2022  3:18 PM  INR(ratio): 1.2 at 5/2/2022  3:18 PM  Age: 57 years

## 2022-05-11 NOTE — LETTER
May 11, 2022    Kenneth Mayorga  [address] [address]             [facility] - Gastroenterology  [address], [place]   [facility] [address]  Phone: [phone]  Fax: [phone] Dear Mr. Mayorga:    We have attempted to contact you regarding your test results. Please contact the office to discuss at [phone].      If you have any questions or concerns, please don't hesitate to call.    Sincerely,        Guillermina Gutierrez MD

## 2022-05-12 ENCOUNTER — TELEPHONE (OUTPATIENT)
Dept: GASTROENTEROLOGY | Facility: CLINIC | Age: 57
End: 2022-05-12
Payer: MEDICAID

## 2022-05-12 NOTE — TELEPHONE ENCOUNTER
----- Message from Lilia Caputo sent at 5/12/2022 10:13 AM CDT -----  Regarding: Pt Advice//Inquiry  Type:  Needs Medical Advice    Who Called: pt sisterJacki  Would the patient rather a call back or a response via [facility]? call  Best Call Back Number:  [phone]  Additional Information: inquiry about test results

## 2022-05-12 NOTE — TELEPHONE ENCOUNTER
Patient informed of results from EGD. Explained to patient about Barnes's esophagus and need for repeat EGD in 1 year.